# Patient Record
Sex: MALE | Race: WHITE | NOT HISPANIC OR LATINO | Employment: FULL TIME | URBAN - METROPOLITAN AREA
[De-identification: names, ages, dates, MRNs, and addresses within clinical notes are randomized per-mention and may not be internally consistent; named-entity substitution may affect disease eponyms.]

---

## 2018-01-01 ENCOUNTER — APPOINTMENT (OUTPATIENT)
Dept: RADIOLOGY | Facility: MEDICAL CENTER | Age: 68
DRG: 436 | End: 2018-01-01
Attending: INTERNAL MEDICINE
Payer: MEDICARE

## 2018-01-01 ENCOUNTER — HOME CARE VISIT (OUTPATIENT)
Dept: HOSPICE | Facility: HOSPICE | Age: 68
End: 2018-01-01
Payer: MEDICARE

## 2018-01-01 ENCOUNTER — APPOINTMENT (OUTPATIENT)
Dept: RADIOLOGY | Facility: MEDICAL CENTER | Age: 68
End: 2018-01-01
Attending: EMERGENCY MEDICINE
Payer: MEDICARE

## 2018-01-01 ENCOUNTER — HOSPICE ADMISSION (OUTPATIENT)
Dept: HOSPICE | Facility: HOSPICE | Age: 68
End: 2018-01-01
Payer: MEDICARE

## 2018-01-01 ENCOUNTER — OFFICE VISIT (OUTPATIENT)
Dept: URGENT CARE | Facility: CLINIC | Age: 68
End: 2018-01-01
Payer: MEDICARE

## 2018-01-01 ENCOUNTER — HOSPITAL ENCOUNTER (OUTPATIENT)
Dept: RADIOLOGY | Facility: MEDICAL CENTER | Age: 68
End: 2018-09-19
Attending: PHYSICIAN ASSISTANT
Payer: MEDICARE

## 2018-01-01 ENCOUNTER — OFFICE VISIT (OUTPATIENT)
Dept: MEDICAL GROUP | Facility: MEDICAL CENTER | Age: 68
End: 2018-01-01
Payer: MEDICARE

## 2018-01-01 ENCOUNTER — HOSPITAL ENCOUNTER (OUTPATIENT)
Facility: MEDICAL CENTER | Age: 68
End: 2018-09-17
Attending: PHYSICIAN ASSISTANT
Payer: MEDICARE

## 2018-01-01 ENCOUNTER — HOSPITAL ENCOUNTER (OUTPATIENT)
Dept: LAB | Facility: MEDICAL CENTER | Age: 68
End: 2018-09-17
Attending: PHYSICIAN ASSISTANT
Payer: MEDICARE

## 2018-01-01 ENCOUNTER — TELEPHONE (OUTPATIENT)
Dept: HEMATOLOGY ONCOLOGY | Facility: MEDICAL CENTER | Age: 68
End: 2018-01-01

## 2018-01-01 ENCOUNTER — APPOINTMENT (OUTPATIENT)
Dept: RADIOLOGY | Facility: IMAGING CENTER | Age: 68
End: 2018-01-01
Attending: NURSE PRACTITIONER
Payer: MEDICARE

## 2018-01-01 ENCOUNTER — APPOINTMENT (OUTPATIENT)
Dept: RADIOLOGY | Facility: MEDICAL CENTER | Age: 68
DRG: 436 | End: 2018-01-01
Attending: EMERGENCY MEDICINE
Payer: MEDICARE

## 2018-01-01 ENCOUNTER — HOSPITAL ENCOUNTER (OUTPATIENT)
Dept: RADIOLOGY | Facility: MEDICAL CENTER | Age: 68
End: 2018-09-18
Attending: PHYSICIAN ASSISTANT
Payer: MEDICARE

## 2018-01-01 ENCOUNTER — HOSPITAL ENCOUNTER (INPATIENT)
Facility: MEDICAL CENTER | Age: 68
LOS: 4 days | DRG: 436 | End: 2018-10-12
Attending: EMERGENCY MEDICINE | Admitting: HOSPITALIST
Payer: MEDICARE

## 2018-01-01 ENCOUNTER — OFFICE VISIT (OUTPATIENT)
Dept: HEMATOLOGY ONCOLOGY | Facility: MEDICAL CENTER | Age: 68
End: 2018-01-01
Payer: MEDICARE

## 2018-01-01 ENCOUNTER — TELEPHONE (OUTPATIENT)
Dept: URGENT CARE | Facility: CLINIC | Age: 68
End: 2018-01-01

## 2018-01-01 ENCOUNTER — HOSPITAL ENCOUNTER (EMERGENCY)
Facility: MEDICAL CENTER | Age: 68
End: 2018-09-25
Attending: EMERGENCY MEDICINE
Payer: MEDICARE

## 2018-01-01 ENCOUNTER — HOSPITAL ENCOUNTER (OUTPATIENT)
Dept: LAB | Facility: MEDICAL CENTER | Age: 68
End: 2018-09-18
Attending: PHYSICIAN ASSISTANT
Payer: MEDICARE

## 2018-01-01 VITALS
WEIGHT: 141.76 LBS | RESPIRATION RATE: 18 BRPM | BODY MASS INDEX: 20.29 KG/M2 | OXYGEN SATURATION: 99 % | HEIGHT: 70 IN | TEMPERATURE: 97.5 F | DIASTOLIC BLOOD PRESSURE: 69 MMHG | HEART RATE: 82 BPM | SYSTOLIC BLOOD PRESSURE: 139 MMHG

## 2018-01-01 VITALS
OXYGEN SATURATION: 98 % | HEIGHT: 70 IN | WEIGHT: 140.2 LBS | BODY MASS INDEX: 20.07 KG/M2 | SYSTOLIC BLOOD PRESSURE: 116 MMHG | RESPIRATION RATE: 16 BRPM | TEMPERATURE: 98 F | HEART RATE: 96 BPM | DIASTOLIC BLOOD PRESSURE: 84 MMHG

## 2018-01-01 VITALS — HEART RATE: 88 BPM | RESPIRATION RATE: 16 BRPM | DIASTOLIC BLOOD PRESSURE: 70 MMHG | SYSTOLIC BLOOD PRESSURE: 120 MMHG

## 2018-01-01 VITALS
BODY MASS INDEX: 20.29 KG/M2 | OXYGEN SATURATION: 94 % | SYSTOLIC BLOOD PRESSURE: 100 MMHG | HEART RATE: 85 BPM | WEIGHT: 141.76 LBS | TEMPERATURE: 98.8 F | RESPIRATION RATE: 16 BRPM | DIASTOLIC BLOOD PRESSURE: 64 MMHG | HEIGHT: 70 IN

## 2018-01-01 VITALS — RESPIRATION RATE: 24 BRPM | SYSTOLIC BLOOD PRESSURE: 102 MMHG | DIASTOLIC BLOOD PRESSURE: 60 MMHG | HEART RATE: 104 BPM

## 2018-01-01 VITALS
BODY MASS INDEX: 20.5 KG/M2 | TEMPERATURE: 98.6 F | RESPIRATION RATE: 16 BRPM | WEIGHT: 143.2 LBS | OXYGEN SATURATION: 96 % | DIASTOLIC BLOOD PRESSURE: 86 MMHG | HEIGHT: 70 IN | HEART RATE: 72 BPM | SYSTOLIC BLOOD PRESSURE: 130 MMHG

## 2018-01-01 VITALS
DIASTOLIC BLOOD PRESSURE: 80 MMHG | RESPIRATION RATE: 16 BRPM | TEMPERATURE: 98.5 F | HEART RATE: 100 BPM | SYSTOLIC BLOOD PRESSURE: 155 MMHG | OXYGEN SATURATION: 95 %

## 2018-01-01 VITALS
SYSTOLIC BLOOD PRESSURE: 116 MMHG | DIASTOLIC BLOOD PRESSURE: 68 MMHG | RESPIRATION RATE: 18 BRPM | OXYGEN SATURATION: 99 % | BODY MASS INDEX: 20.56 KG/M2 | WEIGHT: 143.6 LBS | HEIGHT: 70 IN | HEART RATE: 86 BPM | TEMPERATURE: 98.7 F

## 2018-01-01 VITALS
HEART RATE: 95 BPM | BODY MASS INDEX: 21.08 KG/M2 | RESPIRATION RATE: 17 BRPM | OXYGEN SATURATION: 92 % | TEMPERATURE: 97.7 F | DIASTOLIC BLOOD PRESSURE: 79 MMHG | HEIGHT: 70 IN | SYSTOLIC BLOOD PRESSURE: 165 MMHG | WEIGHT: 147.27 LBS

## 2018-01-01 VITALS — DIASTOLIC BLOOD PRESSURE: 60 MMHG | HEART RATE: 100 BPM | SYSTOLIC BLOOD PRESSURE: 110 MMHG | RESPIRATION RATE: 16 BRPM

## 2018-01-01 VITALS
BODY MASS INDEX: 21.19 KG/M2 | HEART RATE: 88 BPM | HEIGHT: 70 IN | OXYGEN SATURATION: 92 % | SYSTOLIC BLOOD PRESSURE: 120 MMHG | DIASTOLIC BLOOD PRESSURE: 70 MMHG | TEMPERATURE: 98.2 F | RESPIRATION RATE: 20 BRPM | WEIGHT: 148 LBS

## 2018-01-01 VITALS
TEMPERATURE: 98.2 F | WEIGHT: 143 LBS | HEIGHT: 70 IN | SYSTOLIC BLOOD PRESSURE: 120 MMHG | BODY MASS INDEX: 20.47 KG/M2 | DIASTOLIC BLOOD PRESSURE: 70 MMHG | OXYGEN SATURATION: 96 % | RESPIRATION RATE: 16 BRPM | HEART RATE: 87 BPM

## 2018-01-01 VITALS — SYSTOLIC BLOOD PRESSURE: 138 MMHG | DIASTOLIC BLOOD PRESSURE: 70 MMHG | RESPIRATION RATE: 18 BRPM | HEART RATE: 100 BPM

## 2018-01-01 VITALS — RESPIRATION RATE: 16 BRPM | HEART RATE: 100 BPM

## 2018-01-01 VITALS — RESPIRATION RATE: 16 BRPM | HEART RATE: 80 BPM | DIASTOLIC BLOOD PRESSURE: 60 MMHG | SYSTOLIC BLOOD PRESSURE: 120 MMHG

## 2018-01-01 DIAGNOSIS — K86.89 PANCREATIC MASS: ICD-10-CM

## 2018-01-01 DIAGNOSIS — J22 LRTI (LOWER RESPIRATORY TRACT INFECTION): ICD-10-CM

## 2018-01-01 DIAGNOSIS — R35.0 URINARY FREQUENCY: ICD-10-CM

## 2018-01-01 DIAGNOSIS — R91.8 LUNG FIELD ABNORMAL FINDING ON EXAMINATION: ICD-10-CM

## 2018-01-01 DIAGNOSIS — R63.4 WEIGHT LOSS: ICD-10-CM

## 2018-01-01 DIAGNOSIS — R10.9 ABDOMINAL PAIN, UNSPECIFIED ABDOMINAL LOCATION: ICD-10-CM

## 2018-01-01 DIAGNOSIS — K59.00 CONSTIPATION, UNSPECIFIED CONSTIPATION TYPE: ICD-10-CM

## 2018-01-01 DIAGNOSIS — R05.3 PERSISTENT COUGH: ICD-10-CM

## 2018-01-01 DIAGNOSIS — R10.31 RIGHT LOWER QUADRANT ABDOMINAL PAIN: ICD-10-CM

## 2018-01-01 DIAGNOSIS — J11.1 INFLUENZA-LIKE ILLNESS: ICD-10-CM

## 2018-01-01 DIAGNOSIS — R05.9 COUGH: ICD-10-CM

## 2018-01-01 DIAGNOSIS — Z00.00 HEALTHCARE MAINTENANCE: ICD-10-CM

## 2018-01-01 DIAGNOSIS — E87.1 HYPONATREMIA: ICD-10-CM

## 2018-01-01 DIAGNOSIS — R10.9 ACUTE ABDOMINAL PAIN: ICD-10-CM

## 2018-01-01 DIAGNOSIS — R52 INTRACTABLE PAIN: ICD-10-CM

## 2018-01-01 DIAGNOSIS — J98.8 WHEEZING-ASSOCIATED RESPIRATORY INFECTION (WARI): ICD-10-CM

## 2018-01-01 DIAGNOSIS — R10.13 EPIGASTRIC PAIN: ICD-10-CM

## 2018-01-01 DIAGNOSIS — R10.84 GENERALIZED ABDOMINAL PAIN: ICD-10-CM

## 2018-01-01 LAB
ALBUMIN SERPL BCP-MCNC: 2.9 G/DL (ref 3.2–4.9)
ALBUMIN SERPL BCP-MCNC: 3.1 G/DL (ref 3.2–4.9)
ALBUMIN SERPL BCP-MCNC: 3.3 G/DL (ref 3.2–4.9)
ALBUMIN SERPL BCP-MCNC: 3.9 G/DL (ref 3.2–4.9)
ALBUMIN SERPL BCP-MCNC: 4 G/DL (ref 3.2–4.9)
ALBUMIN SERPL BCP-MCNC: 4.3 G/DL (ref 3.2–4.9)
ALBUMIN/GLOB SERPL: 1.1 G/DL
ALBUMIN/GLOB SERPL: 1.2 G/DL
ALBUMIN/GLOB SERPL: 1.3 G/DL
ALBUMIN/GLOB SERPL: 1.4 G/DL
ALBUMIN/GLOB SERPL: 1.6 G/DL
ALBUMIN/GLOB SERPL: 1.7 G/DL
ALP SERPL-CCNC: 40 U/L (ref 30–99)
ALP SERPL-CCNC: 40 U/L (ref 30–99)
ALP SERPL-CCNC: 43 U/L (ref 30–99)
ALP SERPL-CCNC: 45 U/L (ref 30–99)
ALP SERPL-CCNC: 45 U/L (ref 30–99)
ALP SERPL-CCNC: 52 U/L (ref 30–99)
ALT SERPL-CCNC: 10 U/L (ref 2–50)
ALT SERPL-CCNC: 5 U/L (ref 2–50)
ALT SERPL-CCNC: 7 U/L (ref 2–50)
ALT SERPL-CCNC: 7 U/L (ref 2–50)
ALT SERPL-CCNC: 8 U/L (ref 2–50)
ALT SERPL-CCNC: 9 U/L (ref 2–50)
ANION GAP SERPL CALC-SCNC: 10 MMOL/L (ref 0–11.9)
ANION GAP SERPL CALC-SCNC: 11 MMOL/L (ref 0–11.9)
ANION GAP SERPL CALC-SCNC: 14 MMOL/L (ref 0–11.9)
ANION GAP SERPL CALC-SCNC: 17 MMOL/L (ref 0–11.9)
ANION GAP SERPL CALC-SCNC: 8 MMOL/L (ref 0–11.9)
ANION GAP SERPL CALC-SCNC: 9 MMOL/L (ref 0–11.9)
APPEARANCE UR: CLEAR
APPEARANCE UR: CLEAR
AST SERPL-CCNC: 11 U/L (ref 12–45)
AST SERPL-CCNC: 13 U/L (ref 12–45)
AST SERPL-CCNC: 14 U/L (ref 12–45)
AST SERPL-CCNC: 17 U/L (ref 12–45)
AST SERPL-CCNC: 17 U/L (ref 12–45)
AST SERPL-CCNC: 22 U/L (ref 12–45)
BACTERIA UR CULT: NORMAL
BASOPHILS # BLD AUTO: 0.1 % (ref 0–1.8)
BASOPHILS # BLD AUTO: 0.3 % (ref 0–1.8)
BASOPHILS # BLD AUTO: 0.5 % (ref 0–1.8)
BASOPHILS # BLD AUTO: 0.5 % (ref 0–1.8)
BASOPHILS # BLD: 0.02 K/UL (ref 0–0.12)
BASOPHILS # BLD: 0.04 K/UL (ref 0–0.12)
BASOPHILS # BLD: 0.04 K/UL (ref 0–0.12)
BASOPHILS # BLD: 0.05 K/UL (ref 0–0.12)
BASOPHILS # BLD: 0.05 K/UL (ref 0–0.12)
BASOPHILS # BLD: 0.06 K/UL (ref 0–0.12)
BASOPHILS # BLD: 0.06 K/UL (ref 0–0.12)
BILIRUB SERPL-MCNC: 0.5 MG/DL (ref 0.1–1.5)
BILIRUB SERPL-MCNC: 0.7 MG/DL (ref 0.1–1.5)
BILIRUB SERPL-MCNC: 1.1 MG/DL (ref 0.1–1.5)
BILIRUB SERPL-MCNC: 1.1 MG/DL (ref 0.1–1.5)
BILIRUB SERPL-MCNC: 1.4 MG/DL (ref 0.1–1.5)
BILIRUB SERPL-MCNC: 1.7 MG/DL (ref 0.1–1.5)
BILIRUB UR STRIP-MCNC: NORMAL MG/DL
BUN SERPL-MCNC: 10 MG/DL (ref 8–22)
BUN SERPL-MCNC: 11 MG/DL (ref 8–22)
BUN SERPL-MCNC: 12 MG/DL (ref 8–22)
BUN SERPL-MCNC: 7 MG/DL (ref 8–22)
BUN SERPL-MCNC: 7 MG/DL (ref 8–22)
BUN SERPL-MCNC: 9 MG/DL (ref 8–22)
CALCIUM SERPL-MCNC: 8.4 MG/DL (ref 8.4–10.2)
CALCIUM SERPL-MCNC: 8.8 MG/DL (ref 8.4–10.2)
CALCIUM SERPL-MCNC: 8.8 MG/DL (ref 8.4–10.2)
CALCIUM SERPL-MCNC: 9.1 MG/DL (ref 8.4–10.2)
CALCIUM SERPL-MCNC: 9.4 MG/DL (ref 8.5–10.5)
CALCIUM SERPL-MCNC: 9.7 MG/DL (ref 8.4–10.2)
CANCER AG19-9 SERPL-ACNC: 51.9 U/ML (ref 0–35)
CEA SERPL-MCNC: 1.1 NG/ML (ref 0–3)
CHLORIDE SERPL-SCNC: 102 MMOL/L (ref 96–112)
CHLORIDE SERPL-SCNC: 89 MMOL/L (ref 96–112)
CHLORIDE SERPL-SCNC: 93 MMOL/L (ref 96–112)
CHLORIDE SERPL-SCNC: 96 MMOL/L (ref 96–112)
CHLORIDE SERPL-SCNC: 96 MMOL/L (ref 96–112)
CHLORIDE SERPL-SCNC: 97 MMOL/L (ref 96–112)
CO2 SERPL-SCNC: 19 MMOL/L (ref 20–33)
CO2 SERPL-SCNC: 22 MMOL/L (ref 20–33)
CO2 SERPL-SCNC: 22 MMOL/L (ref 20–33)
CO2 SERPL-SCNC: 23 MMOL/L (ref 20–33)
CO2 SERPL-SCNC: 26 MMOL/L (ref 20–33)
CO2 SERPL-SCNC: 28 MMOL/L (ref 20–33)
COLOR UR AUTO: YELLOW
COLOR UR: YELLOW
CREAT SERPL-MCNC: 0.85 MG/DL (ref 0.5–1.4)
CREAT SERPL-MCNC: 0.92 MG/DL (ref 0.5–1.4)
CREAT SERPL-MCNC: 0.95 MG/DL (ref 0.5–1.4)
CREAT SERPL-MCNC: 0.96 MG/DL (ref 0.5–1.4)
CREAT SERPL-MCNC: 0.99 MG/DL (ref 0.5–1.4)
CREAT SERPL-MCNC: 1.02 MG/DL (ref 0.5–1.4)
EOSINOPHIL # BLD AUTO: 0 K/UL (ref 0–0.51)
EOSINOPHIL # BLD AUTO: 0.04 K/UL (ref 0–0.51)
EOSINOPHIL # BLD AUTO: 0.07 K/UL (ref 0–0.51)
EOSINOPHIL # BLD AUTO: 0.09 K/UL (ref 0–0.51)
EOSINOPHIL # BLD AUTO: 0.12 K/UL (ref 0–0.51)
EOSINOPHIL # BLD AUTO: 0.44 K/UL (ref 0–0.51)
EOSINOPHIL # BLD AUTO: 0.46 K/UL (ref 0–0.51)
EOSINOPHIL NFR BLD: 0 % (ref 0–6.9)
EOSINOPHIL NFR BLD: 0.2 % (ref 0–6.9)
EOSINOPHIL NFR BLD: 0.4 % (ref 0–6.9)
EOSINOPHIL NFR BLD: 0.6 % (ref 0–6.9)
EOSINOPHIL NFR BLD: 0.8 % (ref 0–6.9)
EOSINOPHIL NFR BLD: 3.4 % (ref 0–6.9)
EOSINOPHIL NFR BLD: 4.2 % (ref 0–6.9)
ERYTHROCYTE [DISTWIDTH] IN BLOOD BY AUTOMATED COUNT: 40.1 FL (ref 35.9–50)
ERYTHROCYTE [DISTWIDTH] IN BLOOD BY AUTOMATED COUNT: 43.2 FL (ref 35.9–50)
ERYTHROCYTE [DISTWIDTH] IN BLOOD BY AUTOMATED COUNT: 43.6 FL (ref 35.9–50)
ERYTHROCYTE [DISTWIDTH] IN BLOOD BY AUTOMATED COUNT: 44 FL (ref 35.9–50)
ERYTHROCYTE [DISTWIDTH] IN BLOOD BY AUTOMATED COUNT: 44.2 FL (ref 35.9–50)
ERYTHROCYTE [DISTWIDTH] IN BLOOD BY AUTOMATED COUNT: 44.6 FL (ref 35.9–50)
ERYTHROCYTE [DISTWIDTH] IN BLOOD BY AUTOMATED COUNT: 45.5 FL (ref 35.9–50)
GLOBULIN SER CALC-MCNC: 1.8 G/DL (ref 1.9–3.5)
GLOBULIN SER CALC-MCNC: 2.6 G/DL (ref 1.9–3.5)
GLOBULIN SER CALC-MCNC: 2.7 G/DL (ref 1.9–3.5)
GLOBULIN SER CALC-MCNC: 2.8 G/DL (ref 1.9–3.5)
GLOBULIN SER CALC-MCNC: 2.9 G/DL (ref 1.9–3.5)
GLOBULIN SER CALC-MCNC: 3.2 G/DL (ref 1.9–3.5)
GLUCOSE SERPL-MCNC: 105 MG/DL (ref 65–99)
GLUCOSE SERPL-MCNC: 111 MG/DL (ref 65–99)
GLUCOSE SERPL-MCNC: 111 MG/DL (ref 65–99)
GLUCOSE SERPL-MCNC: 113 MG/DL (ref 65–99)
GLUCOSE SERPL-MCNC: 114 MG/DL (ref 65–99)
GLUCOSE SERPL-MCNC: 122 MG/DL (ref 65–99)
GLUCOSE UR STRIP.AUTO-MCNC: NEGATIVE MG/DL
GLUCOSE UR STRIP.AUTO-MCNC: NORMAL MG/DL
HCT VFR BLD AUTO: 38.8 % (ref 42–52)
HCT VFR BLD AUTO: 41.2 % (ref 42–52)
HCT VFR BLD AUTO: 41.4 % (ref 42–52)
HCT VFR BLD AUTO: 42.2 % (ref 42–52)
HCT VFR BLD AUTO: 42.6 % (ref 42–52)
HCT VFR BLD AUTO: 43.2 % (ref 42–52)
HCT VFR BLD AUTO: 45.9 % (ref 42–52)
HGB BLD-MCNC: 13.1 G/DL (ref 14–18)
HGB BLD-MCNC: 14 G/DL (ref 14–18)
HGB BLD-MCNC: 14 G/DL (ref 14–18)
HGB BLD-MCNC: 14.5 G/DL (ref 14–18)
HGB BLD-MCNC: 14.7 G/DL (ref 14–18)
HGB BLD-MCNC: 15.4 G/DL (ref 14–18)
HGB BLD-MCNC: 15.7 G/DL (ref 14–18)
IMM GRANULOCYTES # BLD AUTO: 0.03 K/UL (ref 0–0.11)
IMM GRANULOCYTES # BLD AUTO: 0.06 K/UL (ref 0–0.11)
IMM GRANULOCYTES # BLD AUTO: 0.06 K/UL (ref 0–0.11)
IMM GRANULOCYTES # BLD AUTO: 0.08 K/UL (ref 0–0.11)
IMM GRANULOCYTES # BLD AUTO: 0.08 K/UL (ref 0–0.11)
IMM GRANULOCYTES # BLD AUTO: 0.09 K/UL (ref 0–0.11)
IMM GRANULOCYTES # BLD AUTO: 0.09 K/UL (ref 0–0.11)
IMM GRANULOCYTES NFR BLD AUTO: 0.3 % (ref 0–0.9)
IMM GRANULOCYTES NFR BLD AUTO: 0.4 % (ref 0–0.9)
IMM GRANULOCYTES NFR BLD AUTO: 0.5 % (ref 0–0.9)
IMM GRANULOCYTES NFR BLD AUTO: 0.6 % (ref 0–0.9)
INR PPP: 1.21 (ref 0.87–1.13)
KETONES UR STRIP.AUTO-MCNC: 15 MG/DL
KETONES UR STRIP.AUTO-MCNC: NEGATIVE MG/DL
LACTATE BLD-SCNC: 2.4 MMOL/L (ref 0.5–2)
LEUKOCYTE ESTERASE UR QL STRIP.AUTO: NEGATIVE
LEUKOCYTE ESTERASE UR QL STRIP.AUTO: NORMAL
LIPASE SERPL-CCNC: 18 U/L (ref 11–82)
LIPASE SERPL-CCNC: 19 U/L (ref 7–58)
LIPASE SERPL-CCNC: 21 U/L (ref 7–58)
LYMPHOCYTES # BLD AUTO: 0.95 K/UL (ref 1–4.8)
LYMPHOCYTES # BLD AUTO: 1.08 K/UL (ref 1–4.8)
LYMPHOCYTES # BLD AUTO: 1.08 K/UL (ref 1–4.8)
LYMPHOCYTES # BLD AUTO: 1.1 K/UL (ref 1–4.8)
LYMPHOCYTES # BLD AUTO: 1.59 K/UL (ref 1–4.8)
LYMPHOCYTES # BLD AUTO: 1.68 K/UL (ref 1–4.8)
LYMPHOCYTES # BLD AUTO: 1.72 K/UL (ref 1–4.8)
LYMPHOCYTES NFR BLD: 12.9 % (ref 22–41)
LYMPHOCYTES NFR BLD: 15.7 % (ref 22–41)
LYMPHOCYTES NFR BLD: 5.4 % (ref 22–41)
LYMPHOCYTES NFR BLD: 6.4 % (ref 22–41)
LYMPHOCYTES NFR BLD: 6.8 % (ref 22–41)
LYMPHOCYTES NFR BLD: 7.6 % (ref 22–41)
LYMPHOCYTES NFR BLD: 8.3 % (ref 22–41)
MAGNESIUM SERPL-MCNC: 1.9 MG/DL (ref 1.5–2.5)
MAGNESIUM SERPL-MCNC: 2 MG/DL (ref 1.5–2.5)
MAGNESIUM SERPL-MCNC: 2.1 MG/DL (ref 1.5–2.5)
MCH RBC QN AUTO: 31.5 PG (ref 27–33)
MCH RBC QN AUTO: 31.7 PG (ref 27–33)
MCH RBC QN AUTO: 31.8 PG (ref 27–33)
MCH RBC QN AUTO: 31.8 PG (ref 27–33)
MCH RBC QN AUTO: 32 PG (ref 27–33)
MCH RBC QN AUTO: 32 PG (ref 27–33)
MCH RBC QN AUTO: 32.6 PG (ref 27–33)
MCHC RBC AUTO-ENTMCNC: 33.8 G/DL (ref 33.7–35.3)
MCHC RBC AUTO-ENTMCNC: 33.8 G/DL (ref 33.7–35.3)
MCHC RBC AUTO-ENTMCNC: 34 G/DL (ref 33.7–35.3)
MCHC RBC AUTO-ENTMCNC: 34 G/DL (ref 33.7–35.3)
MCHC RBC AUTO-ENTMCNC: 34.2 G/DL (ref 33.7–35.3)
MCHC RBC AUTO-ENTMCNC: 34.4 G/DL (ref 33.7–35.3)
MCHC RBC AUTO-ENTMCNC: 36.2 G/DL (ref 33.7–35.3)
MCV RBC AUTO: 88 FL (ref 81.4–97.8)
MCV RBC AUTO: 91.5 FL (ref 81.4–97.8)
MCV RBC AUTO: 93.1 FL (ref 81.4–97.8)
MCV RBC AUTO: 94.1 FL (ref 81.4–97.8)
MCV RBC AUTO: 94.2 FL (ref 81.4–97.8)
MCV RBC AUTO: 94.5 FL (ref 81.4–97.8)
MCV RBC AUTO: 95.4 FL (ref 81.4–97.8)
MONOCYTES # BLD AUTO: 1.27 K/UL (ref 0–0.85)
MONOCYTES # BLD AUTO: 1.27 K/UL (ref 0–0.85)
MONOCYTES # BLD AUTO: 1.45 K/UL (ref 0–0.85)
MONOCYTES # BLD AUTO: 1.51 K/UL (ref 0–0.85)
MONOCYTES # BLD AUTO: 1.55 K/UL (ref 0–0.85)
MONOCYTES # BLD AUTO: 2.05 K/UL (ref 0–0.85)
MONOCYTES # BLD AUTO: 2.13 K/UL (ref 0–0.85)
MONOCYTES NFR BLD AUTO: 10.4 % (ref 0–13.4)
MONOCYTES NFR BLD AUTO: 11.1 % (ref 0–13.4)
MONOCYTES NFR BLD AUTO: 11.2 % (ref 0–13.4)
MONOCYTES NFR BLD AUTO: 11.6 % (ref 0–13.4)
MONOCYTES NFR BLD AUTO: 12.8 % (ref 0–13.4)
MONOCYTES NFR BLD AUTO: 7.2 % (ref 0–13.4)
MONOCYTES NFR BLD AUTO: 9.2 % (ref 0–13.4)
NEUTROPHILS # BLD AUTO: 11.7 K/UL (ref 1.82–7.42)
NEUTROPHILS # BLD AUTO: 12.66 K/UL (ref 1.82–7.42)
NEUTROPHILS # BLD AUTO: 13.93 K/UL (ref 1.82–7.42)
NEUTROPHILS # BLD AUTO: 15.19 K/UL (ref 1.82–7.42)
NEUTROPHILS # BLD AUTO: 15.26 K/UL (ref 1.82–7.42)
NEUTROPHILS # BLD AUTO: 7.44 K/UL (ref 1.82–7.42)
NEUTROPHILS # BLD AUTO: 9.34 K/UL (ref 1.82–7.42)
NEUTROPHILS NFR BLD: 67.7 % (ref 44–72)
NEUTROPHILS NFR BLD: 71.6 % (ref 44–72)
NEUTROPHILS NFR BLD: 79.1 % (ref 44–72)
NEUTROPHILS NFR BLD: 79.5 % (ref 44–72)
NEUTROPHILS NFR BLD: 80.3 % (ref 44–72)
NEUTROPHILS NFR BLD: 83.2 % (ref 44–72)
NEUTROPHILS NFR BLD: 86.8 % (ref 44–72)
NITRITE UR QL STRIP.AUTO: NEGATIVE
NITRITE UR QL STRIP.AUTO: NORMAL
NRBC # BLD AUTO: 0 K/UL
NRBC BLD-RTO: 0 /100 WBC
PATHOLOGY CONSULT NOTE: NORMAL
PH UR STRIP.AUTO: 6 [PH]
PH UR STRIP.AUTO: 6.5 [PH] (ref 5–8)
PLATELET # BLD AUTO: 185 K/UL (ref 164–446)
PLATELET # BLD AUTO: 260 K/UL (ref 164–446)
PLATELET # BLD AUTO: 267 K/UL (ref 164–446)
PLATELET # BLD AUTO: 308 K/UL (ref 164–446)
PLATELET # BLD AUTO: 328 K/UL (ref 164–446)
PLATELET # BLD AUTO: 426 K/UL (ref 164–446)
PLATELET # BLD AUTO: 477 K/UL (ref 164–446)
PMV BLD AUTO: 10.8 FL (ref 9–12.9)
PMV BLD AUTO: 8.5 FL (ref 9–12.9)
PMV BLD AUTO: 8.6 FL (ref 9–12.9)
PMV BLD AUTO: 8.7 FL (ref 9–12.9)
PMV BLD AUTO: 8.8 FL (ref 9–12.9)
PMV BLD AUTO: 9.2 FL (ref 9–12.9)
PMV BLD AUTO: 9.5 FL (ref 9–12.9)
POTASSIUM SERPL-SCNC: 3.5 MMOL/L (ref 3.6–5.5)
POTASSIUM SERPL-SCNC: 3.7 MMOL/L (ref 3.6–5.5)
POTASSIUM SERPL-SCNC: 3.9 MMOL/L (ref 3.6–5.5)
POTASSIUM SERPL-SCNC: 4.1 MMOL/L (ref 3.6–5.5)
POTASSIUM SERPL-SCNC: 4.8 MMOL/L (ref 3.6–5.5)
POTASSIUM SERPL-SCNC: 4.8 MMOL/L (ref 3.6–5.5)
PROT SERPL-MCNC: 4.7 G/DL (ref 6–8.2)
PROT SERPL-MCNC: 6 G/DL (ref 6–8.2)
PROT SERPL-MCNC: 6 G/DL (ref 6–8.2)
PROT SERPL-MCNC: 6.7 G/DL (ref 6–8.2)
PROT SERPL-MCNC: 6.9 G/DL (ref 6–8.2)
PROT SERPL-MCNC: 7.2 G/DL (ref 6–8.2)
PROT UR QL STRIP: NEGATIVE MG/DL
PROT UR QL STRIP: NORMAL MG/DL
PROTHROMBIN TIME: 15.2 SEC (ref 12–14.6)
RBC # BLD AUTO: 4.12 M/UL (ref 4.7–6.1)
RBC # BLD AUTO: 4.38 M/UL (ref 4.7–6.1)
RBC # BLD AUTO: 4.38 M/UL (ref 4.7–6.1)
RBC # BLD AUTO: 4.61 M/UL (ref 4.7–6.1)
RBC # BLD AUTO: 4.64 M/UL (ref 4.7–6.1)
RBC # BLD AUTO: 4.81 M/UL (ref 4.7–6.1)
RBC # BLD AUTO: 4.84 M/UL (ref 4.7–6.1)
RBC UR QL AUTO: NEGATIVE
RBC UR QL AUTO: NORMAL
SIGNIFICANT IND 70042: NORMAL
SITE SITE: NORMAL
SODIUM SERPL-SCNC: 128 MMOL/L (ref 135–145)
SODIUM SERPL-SCNC: 129 MMOL/L (ref 135–145)
SODIUM SERPL-SCNC: 130 MMOL/L (ref 135–145)
SODIUM SERPL-SCNC: 130 MMOL/L (ref 135–145)
SODIUM SERPL-SCNC: 132 MMOL/L (ref 135–145)
SODIUM SERPL-SCNC: 133 MMOL/L (ref 135–145)
SOURCE SOURCE: NORMAL
SP GR UR STRIP.AUTO: 1.01
SP GR UR STRIP.AUTO: 1.01
UROBILINOGEN UR STRIP-MCNC: NORMAL MG/DL
WBC # BLD AUTO: 11 K/UL (ref 4.8–10.8)
WBC # BLD AUTO: 13 K/UL (ref 4.8–10.8)
WBC # BLD AUTO: 14.6 K/UL (ref 4.8–10.8)
WBC # BLD AUTO: 16 K/UL (ref 4.8–10.8)
WBC # BLD AUTO: 16.8 K/UL (ref 4.8–10.8)
WBC # BLD AUTO: 17.6 K/UL (ref 4.8–10.8)
WBC # BLD AUTO: 19.1 K/UL (ref 4.8–10.8)

## 2018-01-01 PROCEDURE — S9126 HOSPICE CARE, IN THE HOME, P: HCPCS

## 2018-01-01 PROCEDURE — 99214 OFFICE O/P EST MOD 30 MIN: CPT | Performed by: PHYSICIAN ASSISTANT

## 2018-01-01 PROCEDURE — 6650990 HC HOSPICE AND HOME CARE RX REV CODE 0250

## 2018-01-01 PROCEDURE — 99223 1ST HOSP IP/OBS HIGH 75: CPT | Performed by: INTERNAL MEDICINE

## 2018-01-01 PROCEDURE — 160035 HCHG PACU - 1ST 60 MINS PHASE I: Performed by: INTERNAL MEDICINE

## 2018-01-01 PROCEDURE — G0299 HHS/HOSPICE OF RN EA 15 MIN: HCPCS

## 2018-01-01 PROCEDURE — A9270 NON-COVERED ITEM OR SERVICE: HCPCS | Performed by: INTERNAL MEDICINE

## 2018-01-01 PROCEDURE — 99205 OFFICE O/P NEW HI 60 MIN: CPT | Performed by: NURSE PRACTITIONER

## 2018-01-01 PROCEDURE — 770001 HCHG ROOM/CARE - MED/SURG/GYN PRIV*

## 2018-01-01 PROCEDURE — 99214 OFFICE O/P EST MOD 30 MIN: CPT | Performed by: EMERGENCY MEDICINE

## 2018-01-01 PROCEDURE — 74160 CT ABDOMEN W/CONTRAST: CPT

## 2018-01-01 PROCEDURE — A9270 NON-COVERED ITEM OR SERVICE: HCPCS | Performed by: HOSPITALIST

## 2018-01-01 PROCEDURE — 71046 X-RAY EXAM CHEST 2 VIEWS: CPT | Mod: TC,FY | Performed by: NURSE PRACTITIONER

## 2018-01-01 PROCEDURE — 85025 COMPLETE CBC W/AUTO DIFF WBC: CPT

## 2018-01-01 PROCEDURE — 700111 HCHG RX REV CODE 636 W/ 250 OVERRIDE (IP): Performed by: EMERGENCY MEDICINE

## 2018-01-01 PROCEDURE — 99219 PR INITIAL OBSERVATION CARE,LEVL II: CPT | Performed by: HOSPITALIST

## 2018-01-01 PROCEDURE — 700102 HCHG RX REV CODE 250 W/ 637 OVERRIDE(OP): Performed by: INTERNAL MEDICINE

## 2018-01-01 PROCEDURE — G0378 HOSPITAL OBSERVATION PER HR: HCPCS

## 2018-01-01 PROCEDURE — 96376 TX/PRO/DX INJ SAME DRUG ADON: CPT

## 2018-01-01 PROCEDURE — 99285 EMERGENCY DEPT VISIT HI MDM: CPT

## 2018-01-01 PROCEDURE — 36415 COLL VENOUS BLD VENIPUNCTURE: CPT

## 2018-01-01 PROCEDURE — 700111 HCHG RX REV CODE 636 W/ 250 OVERRIDE (IP): Performed by: HOSPITALIST

## 2018-01-01 PROCEDURE — 74018 RADEX ABDOMEN 1 VIEW: CPT

## 2018-01-01 PROCEDURE — 80053 COMPREHEN METABOLIC PANEL: CPT

## 2018-01-01 PROCEDURE — 99232 SBSQ HOSP IP/OBS MODERATE 35: CPT | Performed by: INTERNAL MEDICINE

## 2018-01-01 PROCEDURE — 700105 HCHG RX REV CODE 258: Performed by: HOSPITALIST

## 2018-01-01 PROCEDURE — A9270 NON-COVERED ITEM OR SERVICE: HCPCS

## 2018-01-01 PROCEDURE — 96375 TX/PRO/DX INJ NEW DRUG ADDON: CPT

## 2018-01-01 PROCEDURE — 700111 HCHG RX REV CODE 636 W/ 250 OVERRIDE (IP): Performed by: INTERNAL MEDICINE

## 2018-01-01 PROCEDURE — 700102 HCHG RX REV CODE 250 W/ 637 OVERRIDE(OP): Performed by: HOSPITALIST

## 2018-01-01 PROCEDURE — 160048 HCHG OR STATISTICAL LEVEL 1-5: Performed by: INTERNAL MEDICINE

## 2018-01-01 PROCEDURE — 0F9G3ZX DRAINAGE OF PANCREAS, PERCUTANEOUS APPROACH, DIAGNOSTIC: ICD-10-PCS | Performed by: INTERNAL MEDICINE

## 2018-01-01 PROCEDURE — 99203 OFFICE O/P NEW LOW 30 MIN: CPT | Performed by: FAMILY MEDICINE

## 2018-01-01 PROCEDURE — 700111 HCHG RX REV CODE 636 W/ 250 OVERRIDE (IP)

## 2018-01-01 PROCEDURE — 700102 HCHG RX REV CODE 250 W/ 637 OVERRIDE(OP)

## 2018-01-01 PROCEDURE — 700117 HCHG RX CONTRAST REV CODE 255: Performed by: PHYSICIAN ASSISTANT

## 2018-01-01 PROCEDURE — 83735 ASSAY OF MAGNESIUM: CPT

## 2018-01-01 PROCEDURE — 88172 CYTP DX EVAL FNA 1ST EA SITE: CPT

## 2018-01-01 PROCEDURE — 85610 PROTHROMBIN TIME: CPT

## 2018-01-01 PROCEDURE — 76705 ECHO EXAM OF ABDOMEN: CPT

## 2018-01-01 PROCEDURE — 74176 CT ABD & PELVIS W/O CONTRAST: CPT

## 2018-01-01 PROCEDURE — 700101 HCHG RX REV CODE 250

## 2018-01-01 PROCEDURE — 99497 ADVNCD CARE PLAN 30 MIN: CPT | Performed by: HOSPITALIST

## 2018-01-01 PROCEDURE — 88305 TISSUE EXAM BY PATHOLOGIST: CPT | Mod: 59

## 2018-01-01 PROCEDURE — 94760 N-INVAS EAR/PLS OXIMETRY 1: CPT | Mod: 59 | Performed by: NURSE PRACTITIONER

## 2018-01-01 PROCEDURE — 700117 HCHG RX CONTRAST REV CODE 255: Performed by: EMERGENCY MEDICINE

## 2018-01-01 PROCEDURE — 700117 HCHG RX CONTRAST REV CODE 255: Performed by: INTERNAL MEDICINE

## 2018-01-01 PROCEDURE — 83690 ASSAY OF LIPASE: CPT

## 2018-01-01 PROCEDURE — 87086 URINE CULTURE/COLONY COUNT: CPT

## 2018-01-01 PROCEDURE — 700105 HCHG RX REV CODE 258: Performed by: EMERGENCY MEDICINE

## 2018-01-01 PROCEDURE — 99231 SBSQ HOSP IP/OBS SF/LOW 25: CPT | Performed by: INTERNAL MEDICINE

## 2018-01-01 PROCEDURE — 81002 URINALYSIS NONAUTO W/O SCOPE: CPT

## 2018-01-01 PROCEDURE — 99497 ADVNCD CARE PLAN 30 MIN: CPT | Mod: 25 | Performed by: INTERNAL MEDICINE

## 2018-01-01 PROCEDURE — 160203 HCHG ENDO MINUTES - 1ST 30 MINS LEVEL 4: Performed by: INTERNAL MEDICINE

## 2018-01-01 PROCEDURE — 160208 HCHG ENDO MINUTES - EA ADDL 1 MIN LEVEL 4: Performed by: INTERNAL MEDICINE

## 2018-01-01 PROCEDURE — 99219 PR INITIAL OBSERVATION CARE,LEVL II: CPT | Mod: 25 | Performed by: HOSPITALIST

## 2018-01-01 PROCEDURE — 160009 HCHG ANES TIME/MIN: Performed by: INTERNAL MEDICINE

## 2018-01-01 PROCEDURE — 99239 HOSP IP/OBS DSCHRG MGMT >30: CPT | Performed by: INTERNAL MEDICINE

## 2018-01-01 PROCEDURE — 74177 CT ABD & PELVIS W/CONTRAST: CPT

## 2018-01-01 PROCEDURE — 0DB98ZX EXCISION OF DUODENUM, VIA NATURAL OR ARTIFICIAL OPENING ENDOSCOPIC, DIAGNOSTIC: ICD-10-PCS | Performed by: INTERNAL MEDICINE

## 2018-01-01 PROCEDURE — 99214 OFFICE O/P EST MOD 30 MIN: CPT | Mod: 25 | Performed by: NURSE PRACTITIONER

## 2018-01-01 PROCEDURE — 88307 TISSUE EXAM BY PATHOLOGIST: CPT

## 2018-01-01 PROCEDURE — 88173 CYTOPATH EVAL FNA REPORT: CPT

## 2018-01-01 PROCEDURE — 81002 URINALYSIS NONAUTO W/O SCOPE: CPT | Performed by: PHYSICIAN ASSISTANT

## 2018-01-01 PROCEDURE — 665036 HSPC NOTICE OF ELECTION NOE

## 2018-01-01 PROCEDURE — 96374 THER/PROPH/DIAG INJ IV PUSH: CPT

## 2018-01-01 PROCEDURE — 83605 ASSAY OF LACTIC ACID: CPT

## 2018-01-01 PROCEDURE — 86301 IMMUNOASSAY TUMOR CA 19-9: CPT

## 2018-01-01 PROCEDURE — 500066 HCHG BITE BLOCK, ECT: Performed by: INTERNAL MEDICINE

## 2018-01-01 PROCEDURE — 94664 DEMO&/EVAL PT USE INHALER: CPT | Performed by: NURSE PRACTITIONER

## 2018-01-01 PROCEDURE — 160002 HCHG RECOVERY MINUTES (STAT): Performed by: INTERNAL MEDICINE

## 2018-01-01 PROCEDURE — 99232 SBSQ HOSP IP/OBS MODERATE 35: CPT | Performed by: HOSPITALIST

## 2018-01-01 PROCEDURE — 82378 CARCINOEMBRYONIC ANTIGEN: CPT

## 2018-01-01 RX ORDER — HYDROMORPHONE HYDROCHLORIDE 2 MG/ML
1 INJECTION, SOLUTION INTRAMUSCULAR; INTRAVENOUS; SUBCUTANEOUS
Status: DISCONTINUED | OUTPATIENT
Start: 2018-01-01 | End: 2018-01-01

## 2018-01-01 RX ORDER — SIMETHICONE 80 MG
80 TABLET,CHEWABLE ORAL
Status: DISCONTINUED | OUTPATIENT
Start: 2018-01-01 | End: 2018-01-01 | Stop reason: HOSPADM

## 2018-01-01 RX ORDER — METOCLOPRAMIDE HYDROCHLORIDE 5 MG/ML
10 INJECTION INTRAMUSCULAR; INTRAVENOUS ONCE
Status: CANCELLED | OUTPATIENT
Start: 2018-01-01 | End: 2018-01-01

## 2018-01-01 RX ORDER — OXYCODONE HYDROCHLORIDE 5 MG/1
2.5 TABLET ORAL
Status: DISCONTINUED | OUTPATIENT
Start: 2018-01-01 | End: 2018-01-01

## 2018-01-01 RX ORDER — HYDROMORPHONE HYDROCHLORIDE 2 MG/ML
0.5 INJECTION, SOLUTION INTRAMUSCULAR; INTRAVENOUS; SUBCUTANEOUS
Status: DISCONTINUED | OUTPATIENT
Start: 2018-01-01 | End: 2018-01-01

## 2018-01-01 RX ORDER — ONDANSETRON 4 MG/1
4 TABLET, ORALLY DISINTEGRATING ORAL EVERY 4 HOURS PRN
Status: DISCONTINUED | OUTPATIENT
Start: 2018-01-01 | End: 2018-01-01 | Stop reason: HOSPADM

## 2018-01-01 RX ORDER — ONDANSETRON 2 MG/ML
4 INJECTION INTRAMUSCULAR; INTRAVENOUS EVERY 4 HOURS PRN
Status: DISCONTINUED | OUTPATIENT
Start: 2018-01-01 | End: 2018-01-01 | Stop reason: HOSPADM

## 2018-01-01 RX ORDER — METOCLOPRAMIDE HYDROCHLORIDE 5 MG/ML
10 INJECTION INTRAMUSCULAR; INTRAVENOUS
Status: DISCONTINUED | OUTPATIENT
Start: 2018-01-01 | End: 2018-01-01

## 2018-01-01 RX ORDER — ALBUTEROL SULFATE 90 UG/1
2 AEROSOL, METERED RESPIRATORY (INHALATION) EVERY 6 HOURS PRN
Qty: 1 INHALER | Refills: 0 | Status: SHIPPED | OUTPATIENT
Start: 2018-01-01 | End: 2018-01-01

## 2018-01-01 RX ORDER — HYDROMORPHONE HYDROCHLORIDE 2 MG/ML
0.2 INJECTION, SOLUTION INTRAMUSCULAR; INTRAVENOUS; SUBCUTANEOUS
Status: DISCONTINUED | OUTPATIENT
Start: 2018-01-01 | End: 2018-01-01 | Stop reason: HOSPADM

## 2018-01-01 RX ORDER — MORPHINE SULFATE 15 MG/1
15 TABLET, FILM COATED, EXTENDED RELEASE ORAL ONCE
Status: COMPLETED | OUTPATIENT
Start: 2018-01-01 | End: 2018-01-01

## 2018-01-01 RX ORDER — MORPHINE SULFATE 15 MG/1
15 TABLET, FILM COATED, EXTENDED RELEASE ORAL EVERY 12 HOURS
Status: DISCONTINUED | OUTPATIENT
Start: 2018-01-01 | End: 2018-01-01

## 2018-01-01 RX ORDER — HALOPERIDOL 5 MG/ML
2-5 INJECTION INTRAMUSCULAR EVERY 4 HOURS PRN
Status: DISCONTINUED | OUTPATIENT
Start: 2018-01-01 | End: 2018-01-01 | Stop reason: HOSPADM

## 2018-01-01 RX ORDER — MORPHINE SULFATE 30 MG/1
30 TABLET, FILM COATED, EXTENDED RELEASE ORAL EVERY 12 HOURS
Status: DISCONTINUED | OUTPATIENT
Start: 2018-01-01 | End: 2018-01-01

## 2018-01-01 RX ORDER — OXYCODONE HYDROCHLORIDE 10 MG/1
10 TABLET ORAL
Status: DISCONTINUED | OUTPATIENT
Start: 2018-01-01 | End: 2018-01-01 | Stop reason: HOSPADM

## 2018-01-01 RX ORDER — MORPHINE SULFATE 4 MG/ML
4 INJECTION, SOLUTION INTRAMUSCULAR; INTRAVENOUS ONCE
Status: COMPLETED | OUTPATIENT
Start: 2018-01-01 | End: 2018-01-01

## 2018-01-01 RX ORDER — DOCUSATE SODIUM 100 MG/1
100 CAPSULE, LIQUID FILLED ORAL 2 TIMES DAILY
Qty: 60 CAP | Refills: 0 | Status: SHIPPED | OUTPATIENT
Start: 2018-01-01 | End: 2018-01-01

## 2018-01-01 RX ORDER — HYDRALAZINE HYDROCHLORIDE 20 MG/ML
5 INJECTION INTRAMUSCULAR; INTRAVENOUS
Status: DISCONTINUED | OUTPATIENT
Start: 2018-01-01 | End: 2018-01-01 | Stop reason: HOSPADM

## 2018-01-01 RX ORDER — ONDANSETRON 2 MG/ML
4 INJECTION INTRAMUSCULAR; INTRAVENOUS
Status: DISCONTINUED | OUTPATIENT
Start: 2018-01-01 | End: 2018-01-01 | Stop reason: HOSPADM

## 2018-01-01 RX ORDER — MORPHINE SULFATE 15 MG/1
TABLET, FILM COATED, EXTENDED RELEASE ORAL
Status: COMPLETED
Start: 2018-01-01 | End: 2018-01-01

## 2018-01-01 RX ORDER — ONDANSETRON 2 MG/ML
4 INJECTION INTRAMUSCULAR; INTRAVENOUS ONCE
Status: COMPLETED | OUTPATIENT
Start: 2018-01-01 | End: 2018-01-01

## 2018-01-01 RX ORDER — NEOSTIGMINE METHYLSULFATE 1 MG/ML
2 INJECTION, SOLUTION INTRAVENOUS ONCE
Status: DISCONTINUED | OUTPATIENT
Start: 2018-01-01 | End: 2018-01-01

## 2018-01-01 RX ORDER — POLYETHYLENE GLYCOL 3350 17 G/17G
1 POWDER, FOR SOLUTION ORAL 2 TIMES DAILY
Status: DISCONTINUED | OUTPATIENT
Start: 2018-01-01 | End: 2018-01-01 | Stop reason: HOSPADM

## 2018-01-01 RX ORDER — OXYCODONE HYDROCHLORIDE 5 MG/1
5 TABLET ORAL
Status: DISCONTINUED | OUTPATIENT
Start: 2018-01-01 | End: 2018-01-01 | Stop reason: HOSPADM

## 2018-01-01 RX ORDER — SODIUM CHLORIDE, SODIUM LACTATE, POTASSIUM CHLORIDE, CALCIUM CHLORIDE 600; 310; 30; 20 MG/100ML; MG/100ML; MG/100ML; MG/100ML
INJECTION, SOLUTION INTRAVENOUS CONTINUOUS
Status: DISCONTINUED | OUTPATIENT
Start: 2018-01-01 | End: 2018-01-01 | Stop reason: HOSPADM

## 2018-01-01 RX ORDER — DOCUSATE SODIUM 100 MG/1
100 CAPSULE, LIQUID FILLED ORAL 2 TIMES DAILY
Qty: 60 CAP | Refills: 0 | Status: SHIPPED | OUTPATIENT
Start: 2018-01-01

## 2018-01-01 RX ORDER — LABETALOL HYDROCHLORIDE 5 MG/ML
5 INJECTION, SOLUTION INTRAVENOUS
Status: DISCONTINUED | OUTPATIENT
Start: 2018-01-01 | End: 2018-01-01 | Stop reason: HOSPADM

## 2018-01-01 RX ORDER — SODIUM CHLORIDE 9 MG/ML
1000 INJECTION, SOLUTION INTRAVENOUS ONCE
Status: COMPLETED | OUTPATIENT
Start: 2018-01-01 | End: 2018-01-01

## 2018-01-01 RX ORDER — OXYCODONE HYDROCHLORIDE 5 MG/1
5 TABLET ORAL
Status: DISCONTINUED | OUTPATIENT
Start: 2018-01-01 | End: 2018-01-01

## 2018-01-01 RX ORDER — OXYCODONE HCL 5 MG/5 ML
5 SOLUTION, ORAL ORAL
Status: DISCONTINUED | OUTPATIENT
Start: 2018-01-01 | End: 2018-01-01 | Stop reason: HOSPADM

## 2018-01-01 RX ORDER — IPRATROPIUM BROMIDE AND ALBUTEROL SULFATE 2.5; .5 MG/3ML; MG/3ML
3 SOLUTION RESPIRATORY (INHALATION) ONCE
Status: COMPLETED | OUTPATIENT
Start: 2018-01-01 | End: 2018-01-01

## 2018-01-01 RX ORDER — IBUPROFEN 200 MG
400 TABLET ORAL EVERY 6 HOURS PRN
Status: SHIPPED | COMMUNITY
End: 2018-01-01

## 2018-01-01 RX ORDER — HYDROCODONE BITARTRATE AND ACETAMINOPHEN 5; 325 MG/1; MG/1
1 TABLET ORAL EVERY 12 HOURS PRN
Qty: 30 TAB | Refills: 0 | Status: SHIPPED | OUTPATIENT
Start: 2018-01-01 | End: 2018-01-01

## 2018-01-01 RX ORDER — CHLORAL HYDRATE 500 MG
1000 CAPSULE ORAL DAILY
Status: SHIPPED | COMMUNITY
End: 2018-01-01

## 2018-01-01 RX ORDER — HYDROCODONE BITARTRATE AND ACETAMINOPHEN 5; 325 MG/1; MG/1
1-2 TABLET ORAL EVERY 6 HOURS PRN
Qty: 10 TAB | Refills: 0 | Status: SHIPPED | OUTPATIENT
Start: 2018-01-01 | End: 2018-01-01 | Stop reason: SDUPTHER

## 2018-01-01 RX ORDER — SODIUM CHLORIDE 9 MG/ML
1000 INJECTION, SOLUTION INTRAVENOUS CONTINUOUS
Status: ACTIVE | OUTPATIENT
Start: 2018-01-01 | End: 2018-01-01

## 2018-01-01 RX ORDER — METHYLPREDNISOLONE 4 MG/1
4 TABLET ORAL DAILY
Qty: 1 KIT | Refills: 0 | Status: SHIPPED | OUTPATIENT
Start: 2018-01-01 | End: 2018-01-01

## 2018-01-01 RX ORDER — OXYCODONE HCL 5 MG/5 ML
10 SOLUTION, ORAL ORAL
Status: DISCONTINUED | OUTPATIENT
Start: 2018-01-01 | End: 2018-01-01 | Stop reason: HOSPADM

## 2018-01-01 RX ORDER — HYDROMORPHONE HYDROCHLORIDE 2 MG/ML
0.25 INJECTION, SOLUTION INTRAMUSCULAR; INTRAVENOUS; SUBCUTANEOUS
Status: DISCONTINUED | OUTPATIENT
Start: 2018-01-01 | End: 2018-01-01

## 2018-01-01 RX ORDER — POTASSIUM CHLORIDE 20 MEQ/1
40 TABLET, EXTENDED RELEASE ORAL ONCE
Status: COMPLETED | OUTPATIENT
Start: 2018-01-01 | End: 2018-01-01

## 2018-01-01 RX ORDER — METOCLOPRAMIDE HYDROCHLORIDE 5 MG/ML
5 INJECTION INTRAMUSCULAR; INTRAVENOUS EVERY 6 HOURS
Status: DISCONTINUED | OUTPATIENT
Start: 2018-01-01 | End: 2018-01-01

## 2018-01-01 RX ORDER — LISINOPRIL 5 MG/1
10 TABLET ORAL
Status: DISCONTINUED | OUTPATIENT
Start: 2018-01-01 | End: 2018-01-01

## 2018-01-01 RX ORDER — HYDROMORPHONE HYDROCHLORIDE 2 MG/ML
0.1 INJECTION, SOLUTION INTRAMUSCULAR; INTRAVENOUS; SUBCUTANEOUS
Status: DISCONTINUED | OUTPATIENT
Start: 2018-01-01 | End: 2018-01-01 | Stop reason: HOSPADM

## 2018-01-01 RX ORDER — MORPHINE SULFATE 30 MG/1
30 TABLET, FILM COATED, EXTENDED RELEASE ORAL EVERY 12 HOURS
Status: DISCONTINUED | OUTPATIENT
Start: 2018-01-01 | End: 2018-01-01 | Stop reason: HOSPADM

## 2018-01-01 RX ORDER — HALOPERIDOL 5 MG/ML
1 INJECTION INTRAMUSCULAR
Status: DISCONTINUED | OUTPATIENT
Start: 2018-01-01 | End: 2018-01-01 | Stop reason: HOSPADM

## 2018-01-01 RX ORDER — LACTULOSE 10 G/15ML
300 SOLUTION ORAL ONCE
Status: DISPENSED | OUTPATIENT
Start: 2018-01-01 | End: 2018-01-01

## 2018-01-01 RX ORDER — MEPERIDINE HYDROCHLORIDE 25 MG/ML
25 INJECTION INTRAMUSCULAR; INTRAVENOUS; SUBCUTANEOUS
Status: DISCONTINUED | OUTPATIENT
Start: 2018-01-01 | End: 2018-01-01 | Stop reason: HOSPADM

## 2018-01-01 RX ORDER — LACTULOSE 20 G/30ML
30 SOLUTION ORAL 3 TIMES DAILY
Status: DISCONTINUED | OUTPATIENT
Start: 2018-01-01 | End: 2018-01-01

## 2018-01-01 RX ORDER — HYDROMORPHONE HYDROCHLORIDE 2 MG/ML
0.4 INJECTION, SOLUTION INTRAMUSCULAR; INTRAVENOUS; SUBCUTANEOUS
Status: DISCONTINUED | OUTPATIENT
Start: 2018-01-01 | End: 2018-01-01 | Stop reason: HOSPADM

## 2018-01-01 RX ORDER — METOCLOPRAMIDE HYDROCHLORIDE 5 MG/ML
10 INJECTION INTRAMUSCULAR; INTRAVENOUS EVERY 6 HOURS
Status: DISCONTINUED | OUTPATIENT
Start: 2018-01-01 | End: 2018-01-01 | Stop reason: HOSPADM

## 2018-01-01 RX ORDER — MIDAZOLAM HYDROCHLORIDE 1 MG/ML
0.5 INJECTION INTRAMUSCULAR; INTRAVENOUS
Status: DISCONTINUED | OUTPATIENT
Start: 2018-01-01 | End: 2018-01-01 | Stop reason: HOSPADM

## 2018-01-01 RX ORDER — SODIUM CHLORIDE 9 MG/ML
INJECTION, SOLUTION INTRAVENOUS CONTINUOUS
Status: DISCONTINUED | OUTPATIENT
Start: 2018-01-01 | End: 2018-01-01 | Stop reason: HOSPADM

## 2018-01-01 RX ADMIN — POLYETHYLENE GLYCOL 3350 1 PACKET: 17 POWDER, FOR SOLUTION ORAL at 05:50

## 2018-01-01 RX ADMIN — HYDROMORPHONE HYDROCHLORIDE 0.25 MG: 2 INJECTION, SOLUTION INTRAMUSCULAR; INTRAVENOUS; SUBCUTANEOUS at 02:12

## 2018-01-01 RX ADMIN — METOCLOPRAMIDE 10 MG: 5 INJECTION, SOLUTION INTRAMUSCULAR; INTRAVENOUS at 04:51

## 2018-01-01 RX ADMIN — OXYCODONE HYDROCHLORIDE 5 MG: 5 TABLET ORAL at 10:03

## 2018-01-01 RX ADMIN — SODIUM CHLORIDE: 9 INJECTION, SOLUTION INTRAVENOUS at 11:45

## 2018-01-01 RX ADMIN — Medication: at 17:37

## 2018-01-01 RX ADMIN — ONDANSETRON HYDROCHLORIDE 4 MG: 2 INJECTION INTRAMUSCULAR; INTRAVENOUS at 11:31

## 2018-01-01 RX ADMIN — ONDANSETRON 4 MG: 2 INJECTION INTRAMUSCULAR; INTRAVENOUS at 15:07

## 2018-01-01 RX ADMIN — SODIUM CHLORIDE: 9 INJECTION, SOLUTION INTRAVENOUS at 11:01

## 2018-01-01 RX ADMIN — MORPHINE SULFATE 30 MG: 30 TABLET, EXTENDED RELEASE ORAL at 17:51

## 2018-01-01 RX ADMIN — SODIUM CHLORIDE: 9 INJECTION, SOLUTION INTRAVENOUS at 03:10

## 2018-01-01 RX ADMIN — ONDANSETRON HYDROCHLORIDE 4 MG: 2 INJECTION INTRAMUSCULAR; INTRAVENOUS at 08:04

## 2018-01-01 RX ADMIN — SIMETHICONE CHEW TAB 80 MG 80 MG: 80 TABLET ORAL at 20:17

## 2018-01-01 RX ADMIN — HYDROMORPHONE HYDROCHLORIDE 1 MG: 2 INJECTION, SOLUTION INTRAMUSCULAR; INTRAVENOUS; SUBCUTANEOUS at 10:32

## 2018-01-01 RX ADMIN — METOCLOPRAMIDE 5 MG: 5 INJECTION, SOLUTION INTRAMUSCULAR; INTRAVENOUS at 11:01

## 2018-01-01 RX ADMIN — METOCLOPRAMIDE 10 MG: 5 INJECTION, SOLUTION INTRAMUSCULAR; INTRAVENOUS at 16:11

## 2018-01-01 RX ADMIN — METOCLOPRAMIDE 5 MG: 5 INJECTION, SOLUTION INTRAMUSCULAR; INTRAVENOUS at 11:44

## 2018-01-01 RX ADMIN — MORPHINE SULFATE 15 MG: 15 TABLET, EXTENDED RELEASE ORAL at 09:27

## 2018-01-01 RX ADMIN — SODIUM CHLORIDE 1000 ML: 9 INJECTION, SOLUTION INTRAVENOUS at 13:41

## 2018-01-01 RX ADMIN — IPRATROPIUM BROMIDE AND ALBUTEROL SULFATE 3 ML: 2.5; .5 SOLUTION RESPIRATORY (INHALATION) at 17:17

## 2018-01-01 RX ADMIN — METOCLOPRAMIDE 5 MG: 5 INJECTION, SOLUTION INTRAMUSCULAR; INTRAVENOUS at 11:45

## 2018-01-01 RX ADMIN — MORPHINE SULFATE 15 MG: 15 TABLET, EXTENDED RELEASE ORAL at 13:42

## 2018-01-01 RX ADMIN — LISINOPRIL 10 MG: 5 TABLET ORAL at 10:02

## 2018-01-01 RX ADMIN — SODIUM CHLORIDE: 9 INJECTION, SOLUTION INTRAVENOUS at 17:38

## 2018-01-01 RX ADMIN — SODIUM CHLORIDE: 9 INJECTION, SOLUTION INTRAVENOUS at 01:30

## 2018-01-01 RX ADMIN — METOCLOPRAMIDE 5 MG: 5 INJECTION, SOLUTION INTRAMUSCULAR; INTRAVENOUS at 17:04

## 2018-01-01 RX ADMIN — Medication: at 23:12

## 2018-01-01 RX ADMIN — HYDROMORPHONE HYDROCHLORIDE 0.5 MG: 2 INJECTION, SOLUTION INTRAMUSCULAR; INTRAVENOUS; SUBCUTANEOUS at 15:11

## 2018-01-01 RX ADMIN — HYDROMORPHONE HYDROCHLORIDE 0.25 MG: 2 INJECTION, SOLUTION INTRAMUSCULAR; INTRAVENOUS; SUBCUTANEOUS at 15:02

## 2018-01-01 RX ADMIN — OXYCODONE HYDROCHLORIDE 5 MG: 5 TABLET ORAL at 20:17

## 2018-01-01 RX ADMIN — MORPHINE SULFATE 15 MG: 15 TABLET, EXTENDED RELEASE ORAL at 17:45

## 2018-01-01 RX ADMIN — SODIUM CHLORIDE 1000 ML: 9 INJECTION, SOLUTION INTRAVENOUS at 08:37

## 2018-01-01 RX ADMIN — LISINOPRIL 10 MG: 5 TABLET ORAL at 05:23

## 2018-01-01 RX ADMIN — SODIUM CHLORIDE: 9 INJECTION, SOLUTION INTRAVENOUS at 19:48

## 2018-01-01 RX ADMIN — SODIUM CHLORIDE 1000 ML: 9 INJECTION, SOLUTION INTRAVENOUS at 11:31

## 2018-01-01 RX ADMIN — Medication: at 21:01

## 2018-01-01 RX ADMIN — OXYCODONE HYDROCHLORIDE 5 MG: 5 TABLET ORAL at 02:48

## 2018-01-01 RX ADMIN — OXYCODONE HYDROCHLORIDE 5 MG: 5 TABLET ORAL at 18:05

## 2018-01-01 RX ADMIN — Medication: at 12:10

## 2018-01-01 RX ADMIN — HYDROMORPHONE HYDROCHLORIDE 0.25 MG: 2 INJECTION, SOLUTION INTRAMUSCULAR; INTRAVENOUS; SUBCUTANEOUS at 18:53

## 2018-01-01 RX ADMIN — HYDROMORPHONE HYDROCHLORIDE 0.25 MG: 2 INJECTION, SOLUTION INTRAMUSCULAR; INTRAVENOUS; SUBCUTANEOUS at 22:03

## 2018-01-01 RX ADMIN — MORPHINE SULFATE 15 MG: 15 TABLET, EXTENDED RELEASE ORAL at 05:51

## 2018-01-01 RX ADMIN — SODIUM CHLORIDE 100 ML: 9 INJECTION, SOLUTION INTRAVENOUS at 08:01

## 2018-01-01 RX ADMIN — ONDANSETRON 4 MG: 2 INJECTION INTRAMUSCULAR; INTRAVENOUS at 23:11

## 2018-01-01 RX ADMIN — IOHEXOL 50 ML: 240 INJECTION, SOLUTION INTRATHECAL; INTRAVASCULAR; INTRAVENOUS; ORAL at 11:45

## 2018-01-01 RX ADMIN — MORPHINE SULFATE 30 MG: 30 TABLET, EXTENDED RELEASE ORAL at 17:38

## 2018-01-01 RX ADMIN — METOCLOPRAMIDE 5 MG: 5 INJECTION, SOLUTION INTRAMUSCULAR; INTRAVENOUS at 22:19

## 2018-01-01 RX ADMIN — ONDANSETRON 4 MG: 2 INJECTION INTRAMUSCULAR; INTRAVENOUS at 03:10

## 2018-01-01 RX ADMIN — SIMETHICONE CHEW TAB 80 MG 80 MG: 80 TABLET ORAL at 11:01

## 2018-01-01 RX ADMIN — SODIUM CHLORIDE: 9 INJECTION, SOLUTION INTRAVENOUS at 02:38

## 2018-01-01 RX ADMIN — SODIUM CHLORIDE: 9 INJECTION, SOLUTION INTRAVENOUS at 22:50

## 2018-01-01 RX ADMIN — MORPHINE SULFATE 30 MG: 30 TABLET, EXTENDED RELEASE ORAL at 04:51

## 2018-01-01 RX ADMIN — MORPHINE SULFATE 15 MG: 15 TABLET, EXTENDED RELEASE ORAL at 06:20

## 2018-01-01 RX ADMIN — METOCLOPRAMIDE 10 MG: 5 INJECTION, SOLUTION INTRAMUSCULAR; INTRAVENOUS at 17:51

## 2018-01-01 RX ADMIN — HYDROMORPHONE HYDROCHLORIDE 0.5 MG: 2 INJECTION, SOLUTION INTRAMUSCULAR; INTRAVENOUS; SUBCUTANEOUS at 05:23

## 2018-01-01 RX ADMIN — OXYCODONE HYDROCHLORIDE 5 MG: 5 TABLET ORAL at 14:00

## 2018-01-01 RX ADMIN — Medication: at 12:14

## 2018-01-01 RX ADMIN — METOCLOPRAMIDE 10 MG: 5 INJECTION, SOLUTION INTRAMUSCULAR; INTRAVENOUS at 23:06

## 2018-01-01 RX ADMIN — LACTULOSE 30 ML: 20 SOLUTION ORAL at 14:00

## 2018-01-01 RX ADMIN — ONDANSETRON 4 MG: 2 INJECTION INTRAMUSCULAR; INTRAVENOUS at 03:06

## 2018-01-01 RX ADMIN — ONDANSETRON 4 MG: 2 INJECTION INTRAMUSCULAR; INTRAVENOUS at 11:01

## 2018-01-01 RX ADMIN — HYDROMORPHONE HYDROCHLORIDE 1 MG: 1 INJECTION, SOLUTION INTRAMUSCULAR; INTRAVENOUS; SUBCUTANEOUS at 11:31

## 2018-01-01 RX ADMIN — HYDROMORPHONE HYDROCHLORIDE 0.25 MG: 2 INJECTION, SOLUTION INTRAMUSCULAR; INTRAVENOUS; SUBCUTANEOUS at 05:50

## 2018-01-01 RX ADMIN — ONDANSETRON 4 MG: 2 INJECTION INTRAMUSCULAR; INTRAVENOUS at 10:32

## 2018-01-01 RX ADMIN — OXYCODONE HYDROCHLORIDE 5 MG: 5 TABLET ORAL at 03:25

## 2018-01-01 RX ADMIN — MORPHINE SULFATE 15 MG: 15 TABLET, EXTENDED RELEASE ORAL at 17:04

## 2018-01-01 RX ADMIN — HYDROMORPHONE HYDROCHLORIDE 0.25 MG: 2 INJECTION, SOLUTION INTRAMUSCULAR; INTRAVENOUS; SUBCUTANEOUS at 12:23

## 2018-01-01 RX ADMIN — METHYLNALTREXONE BROMIDE 12 MG: 12 INJECTION, SOLUTION SUBCUTANEOUS at 15:52

## 2018-01-01 RX ADMIN — IOHEXOL 100 ML: 350 INJECTION, SOLUTION INTRAVENOUS at 09:27

## 2018-01-01 RX ADMIN — SODIUM CHLORIDE: 9 INJECTION, SOLUTION INTRAVENOUS at 22:19

## 2018-01-01 RX ADMIN — ONDANSETRON 4 MG: 2 INJECTION INTRAMUSCULAR; INTRAVENOUS at 05:39

## 2018-01-01 RX ADMIN — IOHEXOL 100 ML: 350 INJECTION, SOLUTION INTRAVENOUS at 12:29

## 2018-01-01 RX ADMIN — MORPHINE SULFATE 15 MG: 15 TABLET, EXTENDED RELEASE ORAL at 05:19

## 2018-01-01 RX ADMIN — HYDROMORPHONE HYDROCHLORIDE 0.5 MG: 2 INJECTION, SOLUTION INTRAMUSCULAR; INTRAVENOUS; SUBCUTANEOUS at 19:14

## 2018-01-01 RX ADMIN — HYDROMORPHONE HYDROCHLORIDE 0.25 MG: 2 INJECTION, SOLUTION INTRAMUSCULAR; INTRAVENOUS; SUBCUTANEOUS at 09:04

## 2018-01-01 RX ADMIN — METOCLOPRAMIDE 5 MG: 5 INJECTION, SOLUTION INTRAMUSCULAR; INTRAVENOUS at 17:34

## 2018-01-01 RX ADMIN — METOCLOPRAMIDE 5 MG: 5 INJECTION, SOLUTION INTRAMUSCULAR; INTRAVENOUS at 23:28

## 2018-01-01 RX ADMIN — SODIUM CHLORIDE: 9 INJECTION, SOLUTION INTRAVENOUS at 08:43

## 2018-01-01 RX ADMIN — MORPHINE SULFATE 4 MG: 4 INJECTION INTRAVENOUS at 08:04

## 2018-01-01 RX ADMIN — MORPHINE SULFATE 30 MG: 30 TABLET, EXTENDED RELEASE ORAL at 05:23

## 2018-01-01 RX ADMIN — METOCLOPRAMIDE 5 MG: 5 INJECTION, SOLUTION INTRAMUSCULAR; INTRAVENOUS at 05:19

## 2018-01-01 RX ADMIN — OXYCODONE HYDROCHLORIDE 5 MG: 5 TABLET ORAL at 07:11

## 2018-01-01 RX ADMIN — SIMETHICONE CHEW TAB 80 MG 80 MG: 80 TABLET ORAL at 17:04

## 2018-01-01 RX ADMIN — POTASSIUM CHLORIDE 20 MEQ: 1500 TABLET, EXTENDED RELEASE ORAL at 10:06

## 2018-01-01 SDOH — ECONOMIC STABILITY: HOUSING INSECURITY: UNSAFE APPLIANCES: 0

## 2018-01-01 SDOH — ECONOMIC STABILITY: HOUSING INSECURITY: UNSAFE COOKING RANGE AREA: 0

## 2018-01-01 ASSESSMENT — COGNITIVE AND FUNCTIONAL STATUS - GENERAL
MOBILITY SCORE: 24
DAILY ACTIVITIY SCORE: 24
SUGGESTED CMS G CODE MODIFIER MOBILITY: CH
SUGGESTED CMS G CODE MODIFIER DAILY ACTIVITY: CH
DAILY ACTIVITIY SCORE: 24
SUGGESTED CMS G CODE MODIFIER DAILY ACTIVITY: CH

## 2018-01-01 ASSESSMENT — ENCOUNTER SYMPTOMS
DEPRESSION: 0
PSYCHIATRIC NEGATIVE: 1
DOUBLE VISION: 0
EYE PAIN: 0
BACK PAIN: 1
BLURRED VISION: 0
PALPITATIONS: 0
WHEEZING: 1
PALPITATIONS: 0
DIAPHORESIS: 1
CONSTIPATION: 1
TINGLING: 0
NEUROLOGICAL NEGATIVE: 1
EYES NEGATIVE: 1
MYALGIAS: 0
NECK PAIN: 0
VOMITING: 0
FATIGUE: 1
FATIGUES EASILY: 1
STOOL FREQUENCY: LESS THAN DAILY
COUGH: 0
ABDOMINAL PAIN: 1
SORE THROAT: 0
HEMOPTYSIS: 0
NAUSEA: 1
SHORTNESS OF BREATH: 0
INSOMNIA: 0
BLOOD IN STOOL: 0
NEUROLOGICAL NEGATIVE: 1
FALLS: 0
NAUSEA: 0
BLURRED VISION: 0
FEVER: 0
BACK PAIN: 0
WEIGHT LOSS: 1
BELCHING: 1
CLAUDICATION: 0
FEVER: 0
NAUSEA: 1
NAUSEA: 1
BACK PAIN: 0
FLANK PAIN: 1
MYALGIAS: 0
CONSTIPATION: 0
CONSTIPATION: 0
CONSTITUTIONAL NEGATIVE: 1
PSYCHIATRIC NEGATIVE: 1
TINGLING: 0
EYES NEGATIVE: 1
DIZZINESS: 0
TINGLING: 0
COUGH: 0
FEVER: 0
SORE THROAT: 0
CHILLS: 1
COUGH: 1
SPUTUM PRODUCTION: 0
RESPIRATORY NEGATIVE: 1
TREMORS: 0
SLEEP QUALITY: POOR
EYE DISCHARGE: 0
WEIGHT LOSS: 1
EYE PAIN: 0
PSYCHIATRIC NEGATIVE: 1
DIARRHEA: 0
HEARTBURN: 0
DIARRHEA: 0
SORE THROAT: 0
CHILLS: 0
HEARTBURN: 0
VOMITING: 0
FALLS: 0
INSOMNIA: 0
NEUROLOGICAL NEGATIVE: 1
WHEEZING: 0
TINGLING: 0
DEPRESSION: 0
NECK PAIN: 0
NECK PAIN: 0
WEIGHT LOSS: 1
NECK PAIN: 0
WEAKNESS: 0
EYE REDNESS: 0
DIZZINESS: 0
EYE PAIN: 0
EYE PAIN: 0
COUGH: 0
EYE PAIN: 0
ABDOMINAL PAIN: 1
BLOOD IN STOOL: 0
CHILLS: 0
ORTHOPNEA: 0
FATIGUES EASILY: 1
CONSTIPATION: 0
NAUSEA: 1
BLURRED VISION: 0
CONSTIPATION: 1
DIZZINESS: 0
SHORTNESS OF BREATH: 0
SENSORY CHANGE: 0
BLOOD IN STOOL: 0
TREMORS: 0
NAUSEA: 0
SENSORY CHANGE: 0
SORE THROAT: 0
ABDOMINAL PAIN: 1
FATIGUE: 1
NAUSEA: 1
NAUSEA: 1
BACK PAIN: 0
HEMOPTYSIS: 0
SPUTUM PRODUCTION: 1
HEADACHES: 0
LOWER EXTREMITY EDEMA: 1
CLAUDICATION: 0
BLURRED VISION: 0
VOMITING: 1
DRY SKIN: 1
BACK PAIN: 0
DIZZINESS: 0
NAUSEA: 1
STOOL FREQUENCY: LESS THAN DAILY
NECK PAIN: 0
HEARTBURN: 0
NECK PAIN: 0
NAUSEA: 1
PALPITATIONS: 0
FATIGUES EASILY: 1
CONSTIPATION: 0
DEPRESSION: 0
HYPERTENSION: 1
NAUSEA: 1
CONSTIPATION: 1
LAST BOWEL MOVEMENT: 64937
ANOREXIA: 0
WHEEZING: 0
PSYCHIATRIC NEGATIVE: 1
BACK PAIN: 0
DIZZINESS: 0
SPEECH CHANGE: 0
EYES NEGATIVE: 1
SLEEP QUALITY: FAIR
DIARRHEA: 0
CONSTIPATION: 1
ANOREXIA: 1
DIARRHEA: 0
CONSTIPATION: 1
SHORTNESS OF BREATH: 1
FEVER: 0
INSOMNIA: 0
TINGLING: 0
HEARTBURN: 0
EYE PAIN: 0
ABDOMINAL PAIN: 1
STRIDOR: 0
CONSTIPATION: 1
CARDIOVASCULAR NEGATIVE: 1
VOMITING: 0
DIZZINESS: 0
NAUSEA: 0
PALPITATIONS: 0
BLURRED VISION: 0
ABDOMINAL PAIN: 1
SHORTNESS OF BREATH: 1
VOMITING: 0
SLEEP QUALITY: FAIR
VOMITING: 0
HEMATOCHEZIA: 0
RESPIRATORY NEGATIVE: 1
SORE THROAT: 0
SHORTNESS OF BREATH: 0
LAST BOWEL MOVEMENT: 64932
ABDOMINAL PAIN: 1
NAUSEA: 0
NERVOUS/ANXIOUS: 0
FEVER: 0
MYALGIAS: 0
FLANK PAIN: 0
DIZZINESS: 0
CONSTIPATION: 0
CONSTITUTIONAL NEGATIVE: 1
HEARTBURN: 0
ABDOMINAL PAIN: 1
DIARRHEA: 0
CHILLS: 0
SHORTNESS OF BREATH: 0
INSOMNIA: 1
LOWER EXTREMITY EDEMA: 1
VOMITING: 0
VOMITING: 0
DEPRESSION: 0
NAUSEA: 1
VOMITING: 0
MUSCULOSKELETAL NEGATIVE: 1
FEVER: 0
SORE THROAT: 0
HEADACHES: 0
FEVER: 0
ABDOMINAL PAIN: 1
DOUBLE VISION: 0
DEPRESSION: 0
TINGLING: 0
ABDOMINAL PAIN: 1
FLATUS: 1
SORE THROAT: 0
ABDOMINAL PAIN: 1
SPUTUM PRODUCTION: 0
SHORTNESS OF BREATH: 0
CONSTIPATION: 1
TINGLING: 0
CONSTITUTIONAL NEGATIVE: 1
VOMITING: 0
WEIGHT LOSS: 1
PHOTOPHOBIA: 0
DYSPNEA ON EXERTION: 1
DEPRESSION: 0
FEVER: 0
NAUSEA: 0
DYSPNEA ACTIVITY LEVEL: AFTER AMBULATING LESS THAN 10 FT
SPUTUM PRODUCTION: 1
VOMITING: 0
COUGH: 0
PND: 0
FEVER: 0
ABDOMINAL PAIN: 1
STRIDOR: 0
BLOOD IN STOOL: 0
MEMORY LOSS: 0
ABDOMINAL PAIN: 1
CHILLS: 0
ABDOMINAL PAIN: 1
BLOOD IN STOOL: 0
SEIZURES: 0
CONSTIPATION: 0
PALPITATIONS: 0
COUGH: 1
ABDOMINAL PAIN: 1
HEADACHES: 0
COUGH: 0
SORE THROAT: 0
LOWER EXTREMITY EDEMA: 1
DIARRHEA: 0
SINUS PAIN: 0
CHILLS: 0
PHOTOPHOBIA: 0
MUSCULOSKELETAL NEGATIVE: 1
ABDOMINAL PAIN: 1
BLOOD IN STOOL: 0
FLATUS: 0
WHEEZING: 0
WEAKNESS: 1
MYALGIAS: 0
FLATUS: 0
MUSCULOSKELETAL NEGATIVE: 1
SHORTNESS OF BREATH: 0
SHORTNESS OF BREATH: 0
FEVER: 0
STOOL FREQUENCY: LESS THAN DAILY
LOSS OF CONSCIOUSNESS: 0
EYE PAIN: 0
HEARTBURN: 0
EYES NEGATIVE: 1
SLEEP QUALITY: FAIR
CARDIOVASCULAR NEGATIVE: 1
INSOMNIA: 0
DIZZINESS: 0
ORTHOPNEA: 0
VOMITING: 0
NAUSEA: 0
CONSTITUTIONAL NEGATIVE: 1
SHORTNESS OF BREATH: 1
SORE THROAT: 0
BLURRED VISION: 0
CHILLS: 0
STOOL FREQUENCY: LESS THAN DAILY
CARDIOVASCULAR NEGATIVE: 1
EYE REDNESS: 0
COUGH: 0
CHILLS: 0
MUSCULOSKELETAL NEGATIVE: 1
CARDIOVASCULAR NEGATIVE: 1
NEUROLOGICAL NEGATIVE: 1
RESPIRATORY NEGATIVE: 1
SHORTNESS OF BREATH: 0
PALPITATIONS: 0
DEPRESSION: 0
DIARRHEA: 0
STOOL FREQUENCY: LESS THAN DAILY
INSOMNIA: 0
ABDOMINAL PAIN: 1
DIZZINESS: 0
CHILLS: 0
PALPITATIONS: 0
DIZZINESS: 0
BLOOD IN STOOL: 0
RESPIRATORY NEGATIVE: 1
SLEEP QUALITY: POOR
DRY SKIN: 1
BACK PAIN: 0
SHORTNESS OF BREATH: 0
COUGH: 0
HEADACHES: 0
NAUSEA: 0
ABDOMINAL PAIN: 1

## 2018-01-01 ASSESSMENT — PAIN SCALES - GENERAL
PAINLEVEL_OUTOF10: 7
PAINLEVEL_OUTOF10: 6
PAINLEVEL_OUTOF10: 8
PAINLEVEL_OUTOF10: 6
PAINLEVEL_OUTOF10: 2
PAINLEVEL_OUTOF10: 8
PAINLEVEL_OUTOF10: 6
PAINLEVEL_OUTOF10: 0
PAINLEVEL_OUTOF10: 0
PAINLEVEL_OUTOF10: 5
PAINLEVEL_OUTOF10: 6
PAINLEVEL_OUTOF10: 5
PAINLEVEL_OUTOF10: 7
PAINLEVEL_OUTOF10: 2
PAINLEVEL: 9=SEVERE PAIN
PAINLEVEL_OUTOF10: 4
PAINLEVEL_OUTOF10: 6
PAINLEVEL_OUTOF10: 4
PAINLEVEL: NO PAIN
PAINLEVEL_OUTOF10: 8
PAINLEVEL_OUTOF10: 8
PAINLEVEL_OUTOF10: 4
PAINLEVEL_OUTOF10: 7
PAINLEVEL_OUTOF10: 6
PAINLEVEL_OUTOF10: 7
PAINLEVEL_OUTOF10: 5
PAINLEVEL_OUTOF10: 4
PAINLEVEL_OUTOF10: 4
PAINLEVEL_OUTOF10: 7
PAINLEVEL_OUTOF10: 8

## 2018-01-01 ASSESSMENT — ACTIVITIES OF DAILY LIVING (ADL)
MONEY MANAGEMENT (EXPENSES/BILLS): INDEPENDENT

## 2018-01-01 ASSESSMENT — PATIENT HEALTH QUESTIONNAIRE - PHQ9
CLINICAL INTERPRETATION OF PHQ2 SCORE: 0
1. LITTLE INTEREST OR PLEASURE IN DOING THINGS: NOT AT ALL
1. LITTLE INTEREST OR PLEASURE IN DOING THINGS: NOT AT ALL
SUM OF ALL RESPONSES TO PHQ9 QUESTIONS 1 AND 2: 0
SUM OF ALL RESPONSES TO PHQ9 QUESTIONS 1 AND 2: 0
2. FEELING DOWN, DEPRESSED, IRRITABLE, OR HOPELESS: NOT AT ALL
SUM OF ALL RESPONSES TO PHQ9 QUESTIONS 1 AND 2: 0
2. FEELING DOWN, DEPRESSED, IRRITABLE, OR HOPELESS: NOT AT ALL
2. FEELING DOWN, DEPRESSED, IRRITABLE, OR HOPELESS: NOT AT ALL
1. LITTLE INTEREST OR PLEASURE IN DOING THINGS: NOT AT ALL

## 2018-01-01 ASSESSMENT — SOCIAL DETERMINANTS OF HEALTH (SDOH)
ACTIVE STRESSOR - HEALTH CHANGES: 1
ACTIVE STRESSOR - LOSS OF CONTROL: 1
ACTIVE STRESSOR - EXPRESSED EMOTIONAL NEED: 1
ACTIVE STRESSOR - EXPRESSED EMOTIONAL NEED: 1
ACTIVE STRESSOR - HEALTH CHANGES: 1
ACTIVE STRESSOR - EXHAUSTION: 1
ACTIVE STRESSOR - EXPRESSED EMOTIONAL NEED: 1
ACTIVE STRESSOR - HEALTH CHANGES: 1
ACTIVE STRESSOR - EXHAUSTION: 1
ACTIVE STRESSOR - EXHAUSTION: 1
ACTIVE STRESSOR - EXPRESSED EMOTIONAL NEED: 1
ACTIVE STRESSOR - EXPRESSED EMOTIONAL NEED: 1
ACTIVE STRESSOR - HEALTH CHANGES: 1
ACTIVE STRESSOR - NO STRESS FACTORS: 1

## 2018-01-01 ASSESSMENT — LIFESTYLE VARIABLES
ALCOHOL_USE: NO
EVER_SMOKED: NEVER

## 2018-01-01 ASSESSMENT — PAIN DESCRIPTION - DESCRIPTORS
DESCRIPTORS: MISERABLE
DESCRIPTORS: SHARP

## 2018-03-30 NOTE — PROGRESS NOTES
"Subjective:      Uriel Branham is a 67 y.o. male who presents with Weight Loss (in 14 days lost 15 lbs, was thinking was fighting the flu) and Ear Fullness (can not hear out of R ear)            Subjective: Patient is a 67-year-old male who comes with a 2 week history of what started as body aches, chills, fever, cough. He states over the last 2 weeks he lost 15 pounds because he was so weak. He states he feels 100 times better today but still has a productive cough with some green sputum. Denies current fever, chills, shortness of breath, weakness, dizziness. States he is here because he wanted to be evaluated. Denies night sweats or recent travel. Patient states that he thinks he had the flu.   Patient denies any known medical problems,  Denies pertinent surgical history  Denies pertinent family history  Allergies: No known drug allergies      Weight Loss   Associated symptoms include chills and coughing. Pertinent negatives include no chest pain, congestion, myalgias, nausea, rash or vomiting.   Ear Fullness   Associated symptoms include chills and coughing. Pertinent negatives include no chest pain, congestion, myalgias, nausea, rash or vomiting.       Review of Systems   Constitutional: Positive for chills and weight loss.   HENT: Negative for congestion.    Respiratory: Positive for cough and sputum production. Negative for shortness of breath and wheezing.    Cardiovascular: Negative for chest pain.   Gastrointestinal: Negative for heartburn, nausea and vomiting.   Genitourinary: Negative.    Musculoskeletal: Negative for myalgias.   Skin: Negative for itching and rash.   Neurological: Negative for dizziness.   Psychiatric/Behavioral: Negative for depression.          Objective:     /86   Pulse 72   Temp 37 °C (98.6 °F)   Resp 16   Ht 1.778 m (5' 10\")   Wt 65 kg (143 lb 3.2 oz)   SpO2 96%   BMI 20.55 kg/m²      Physical Exam       Gen.: Patient is A and O ×3, no acute distress, well-nourished " well-hydrated  Vitals: Are listed and unremarkable  HEENT: Heads normocephalic, atraumatic, PERRLA, extraocular movements intact, TMs and oropharynx clear  Neck: Soft supple without cervical lymphadenopathy  Cardiovascular: Regular rate and rhythm normal S1 and S2. No murmurs, rubs or gallops  Lungs are clear to auscultation bilaterally. no wheezes rales or rhonchi  Abdomen is soft, nontender, nondistended with good bowel sounds, no hepatosplenomegaly  Skin: Is well perfused without evidence of rash or lesions  Neurological:  cranial nerves II through XII were assessed and intact.  Musculoskeletal: Full range of motion, 5 out of 5 strength against resistance  Neurovascularly: Intact with a 2 second cap refill, good distal pulses    Urgent care course I ordered a checks x-ray and labs.     Assessment/Plan:     1. Influenza-like illness    - DX-CHEST-2 VIEWS; Future  - CBC WITH DIFFERENTIAL; Future  - BASIC METABOLIC PANEL; Future  - TSH; Future    2. Persistent cough      3. Lung field abnormal finding on examination      4. Weight loss      , I fully examined the patient and discussed my recommendations. I ordered a chest x-ray and labs. Patient left while I was in another exam room stating he did not have time to get anything done to the medical assistant

## 2018-05-02 NOTE — PROGRESS NOTES
Subjective:     Uriel Branham is a 67 y.o. male who presents for Cough  Patient presents to clinic today with complaints of a cough, chest tightness, shortness of breath. Patient states that in the past week he has not able to lie flat at night to sleep. Patient feels short of breath. Patient states prior to week ago he was able to run and play tennis without any problems. Patient states cough is productive at times. Patient denies any fevers, body aches, malaise. Patient has an appointment scheduled with his ENT specialist next week for evaluation of his sinuses.     Cough   This is a new problem. The current episode started 1 to 4 weeks ago. The problem has been gradually worsening. The problem occurs constantly. The cough is non-productive. Associated symptoms include shortness of breath and wheezing. Pertinent negatives include no chest pain, chills, fever, myalgias, nasal congestion, postnasal drip, rash or sore throat. Nothing aggravates the symptoms. Risk factors for lung disease include smoking/tobacco exposure. He has tried nothing for the symptoms. The treatment provided no relief. His past medical history is significant for pneumonia.   No past medical history on file.No past surgical history on file.  Social History     Social History   • Marital status: Single     Spouse name: N/A   • Number of children: N/A   • Years of education: N/A     Occupational History   • Not on file.     Social History Main Topics   • Smoking status: Never Smoker   • Smokeless tobacco: Never Used   • Alcohol use Not on file   • Drug use: Unknown   • Sexual activity: Not on file     Other Topics Concern   • Not on file     Social History Narrative   • No narrative on file    No family history on file. Review of Systems   Constitutional: Negative for chills and fever.   HENT: Negative for postnasal drip and sore throat.    Eyes: Negative for pain.   Respiratory: Positive for cough, sputum production, shortness of breath and  "wheezing.    Cardiovascular: Negative for chest pain.   Gastrointestinal: Negative for nausea and vomiting.   Genitourinary: Negative for hematuria.   Musculoskeletal: Negative for myalgias.   Skin: Negative for rash.   Neurological: Negative for dizziness.   No Known Allergies   Objective:   /70   Pulse 88   Temp 36.8 °C (98.2 °F)   Resp 20   Ht 1.778 m (5' 10\")   Wt 67.1 kg (148 lb)   SpO2 92%   BMI 21.24 kg/m²   Physical Exam   Constitutional: He is oriented to person, place, and time. He appears well-developed and well-nourished. No distress.   HENT:   Head: Normocephalic and atraumatic.   Right Ear: Tympanic membrane normal.   Left Ear: Tympanic membrane normal.   Nose: Nose normal. Right sinus exhibits no maxillary sinus tenderness and no frontal sinus tenderness. Left sinus exhibits no maxillary sinus tenderness and no frontal sinus tenderness.   Mouth/Throat: Uvula is midline, oropharynx is clear and moist and mucous membranes are normal. No posterior oropharyngeal edema, posterior oropharyngeal erythema or tonsillar abscesses. No tonsillar exudate.   Eyes: Conjunctivae and EOM are normal. Pupils are equal, round, and reactive to light. Right eye exhibits no discharge. Left eye exhibits no discharge.   Cardiovascular: Normal rate and regular rhythm.    No murmur heard.  Pulmonary/Chest: Effort normal. No respiratory distress. He has no decreased breath sounds. He has wheezes. He has no rhonchi. He has no rales.   Abdominal: Soft. He exhibits no distension. There is no tenderness.   Neurological: He is alert and oriented to person, place, and time. He has normal reflexes. No sensory deficit.   Skin: Skin is warm, dry and intact.   Psychiatric: He has a normal mood and affect.   Vitals reviewed.        Assessment/Plan:   Assessment    1. Cough  2. Wheezing-associated respiratory infection (WARI)  - ipratropium-albuterol (DUONEB) nebulizer solution; 3 mL by Nebulization route Once.  - DX-CHEST-2 " VIEWS; Future  - MethylPREDNISolone (MEDROL DOSEPAK) 4 MG Tablet Therapy Pack; Take 1 Tab by mouth every day.  Dispense: 1 Kit; Refill: 0  - albuterol 108 (90 Base) MCG/ACT Aero Soln inhalation aerosol; Inhale 2 Puffs by mouth every 6 hours as needed for Shortness of Breath.  Dispense: 1 Inhaler; Refill: 0    Xray results  No radiographic evidence of acute cardiopulmonary process    Lung sounds mildly improved with breathing treatment, wheezing still noted. PO2 reassessed  And 92%.   Patient without fevers, without productive cough. Do not suspect bacterial etiology. Will tx with steroids and inhaler.    Advised if symptoms don't improve or worsens must return to clinic for reevaluation.    Patient given precautionary s/sx that mandate immediate follow up and evaluation in the ED. Advised of risks of not doing so.    DDX, Supportive care, and indications for immediate follow-up discussed with patient.    Instructed to return to clinic or nearest emergency department if we are not available for any change in condition, further concerns, or worsening of symptoms.    The patient demonstrated a good understanding and agreed with the treatment plan.

## 2018-09-24 NOTE — PROGRESS NOTES
"Subjective:      Uriel Branham is a 68 y.o. male who presents with LLQ Pain (x 2 months, Left lower abdominal pain, needs something for pain  \"Pt. was seen on 09/17/18\")            Abdominal Pain   This is a new problem. Episode onset: 2 weeks. The problem occurs daily. The problem has been rapidly worsening. The pain is located in the generalized abdominal region and left flank. The quality of the pain is a sensation of fullness and aching. Associated symptoms include anorexia, belching, constipation, nausea and weight loss. Pertinent negatives include no diarrhea, dysuria, fever, flatus, frequency, hematochezia, hematuria, melena or vomiting. Nothing aggravates the pain. The pain is relieved by nothing. He has tried acetaminophen for the symptoms. The treatment provided no relief. Prior diagnostic workup includes CT scan and ultrasound.   Patient notes worsening of symptoms last 2 days; unable to see new oncologist for several more days.  PMH new pancreatic mass.    Review of Systems   Constitutional: Positive for diaphoresis and weight loss. Negative for fever.        Night sweats   Respiratory: Negative for shortness of breath.    Cardiovascular: Negative for chest pain.   Gastrointestinal: Positive for abdominal pain, anorexia, constipation and nausea. Negative for blood in stool, diarrhea, flatus, hematochezia, melena and vomiting.   Genitourinary: Negative for dysuria, frequency, hematuria and urgency.   Skin: Negative for rash.   Neurological: Negative for dizziness.     PMH:  has no past medical history on file.  MEDS:   Current Outpatient Prescriptions:   •  MethylPREDNISolone (MEDROL DOSEPAK) 4 MG Tablet Therapy Pack, Take 1 Tab by mouth every day., Disp: 1 Kit, Rfl: 0  •  albuterol 108 (90 Base) MCG/ACT Aero Soln inhalation aerosol, Inhale 2 Puffs by mouth every 6 hours as needed for Shortness of Breath., Disp: 1 Inhaler, Rfl: 0  ALLERGIES: No Known Allergies  SURGHX: No past surgical history on " "file.  SOCHX:  reports that he has quit smoking. He has never used smokeless tobacco.  FH: family history is not on file.       Objective:     /84 (BP Location: Left arm, Patient Position: Sitting, BP Cuff Size: Adult)   Pulse 96   Temp 36.7 °C (98 °F) (Temporal)   Resp 16   Ht 1.778 m (5' 10\")   Wt 63.6 kg (140 lb 3.2 oz)   SpO2 98%   BMI 20.12 kg/m²      Physical Exam   Constitutional: He is oriented to person, place, and time. Vital signs are normal. He appears well-developed and well-nourished. He is cooperative.  Non-toxic appearance. He does not have a sickly appearance. He does not appear ill. No distress.   HENT:   Mouth/Throat: Mucous membranes are not pale and dry.   Eyes: Conjunctivae and lids are normal. No scleral icterus.   Neck: Phonation normal. Neck supple.   Cardiovascular: Normal rate, regular rhythm and normal heart sounds.    Pulmonary/Chest: Effort normal and breath sounds normal.   Abdominal: Soft. He exhibits distension. He exhibits no mass. Bowel sounds are increased. There is generalized tenderness. There is guarding. There is no rigidity, no rebound and no CVA tenderness. No hernia.   Neurological: He is alert and oriented to person, place, and time.   Skin: Skin is warm and dry.   Psychiatric: He has a normal mood and affect.     Advised patient that due to escalating symptoms ED evaluation and management is recommended.  Patient agreed; notes that he has some things to take care of first.  Advised n.p.o. and ED evaluation as soon as possible.     [facility] EDP provided telephone report.     Assessment/Plan:     1. Generalized abdominal pain    2. Pancreatic mass        "

## 2018-09-25 NOTE — ED NOTES
ERP at bedside. Pt agrees with plan of care discussed by ERP. AIDET acknowledged with patient. Trey in low position, side rail up for pt safety. Call light within reach. Will continue to monitor.

## 2018-09-25 NOTE — ED NOTES
Discharge information reviewed in detail. Pt verbalized understanding of discharge instructions to follow up with Oncology, PCP and to return to ER if condition worsens. Pt educated/expressed awareness of not driving or operating heavy machinery.   Patient educated no combining of alcohol with other sedating medications. Patient reports that he has a safe ride home. Pt ambulated out of ER in a steady gait.

## 2018-09-25 NOTE — ED PROVIDER NOTES
ED Provider Note    CHIEF COMPLAINT  Chief Complaint   Patient presents with   • Abdominal Pain       HPI  Uriel Branham is a 68 y.o. male who presents complaining of epigastric abdominal pain that radiates around to his left back and left iliac crest area.  He states that he has had this pain for the last 6 months and on the 18th of this month had a CT of the abdomen which showed a pancreatic mass in the tail the pancreas.  He recently had an ultrasound that showed the same thing.  He currently is set up for an oncology appointment on October 3 but no one is prescribe him any pain medication and he has not had any pain relief.  Last night he tried some medical marijuana but still did not feel better.  He states he has been constipated for the last 2 days.  He denies any vomiting or fevers.  He states the pain is constant and unrelenting.  Patient has a history of smoking but does not currently smoke.  He denies any alcohol or other drug use except for the marijuana he tried last night.  He has reported weight loss and decreased appetite.    REVIEW OF SYSTEMS  HEENT:  No ear pain, congestion or sore throat   EYES: no discharge redness or vision changes  CARDIAC: no chest pain, palpitations    PULMONARY: no dyspnea, cough or congestion   GI: See history of present illness  : no dysuria, back pain or hematuria   Neuro: no weakness, numbness aphasia or headache  Musculoskeletal: no swelling deformity or pain no joint swelling  Endocrine: no fevers, sweating, weight loss   SKIN: no rash, erythema or contusions     See history of present illness all other systems are negative      PAST MEDICAL HISTORY  No past medical history on file.    FAMILY HISTORY  History reviewed. No pertinent family history.    SOCIAL HISTORY  Social History     Social History   • Marital status: Single     Spouse name: N/A   • Number of children: N/A   • Years of education: N/A     Social History Main Topics   • Smoking status: Former Smoker  "  • Smokeless tobacco: Never Used   • Alcohol use Not on file   • Drug use: Unknown   • Sexual activity: Not on file     Other Topics Concern   • Not on file     Social History Narrative   • No narrative on file       SURGICAL HISTORY  No past surgical history on file.    CURRENT MEDICATIONS  Home Medications     Reviewed by Randy Lowry (Pharmacy Tech) on 09/25/18 at 0751  Med List Status: Complete   Medication Last Dose Status   B Complex Vitamins (B COMPLEX PO) > 5 days Active   Cholecalciferol (VITAMIN D PO) > 5 days Active   ibuprofen (MOTRIN) 200 MG Tab < 2 days Active   Omega-3 Fatty Acids (FISH OIL) 1000 MG Cap capsule > 5 days Active                 ALLERGIES  No Known Allergies    PHYSICAL EXAM  VITAL SIGNS: /69   Pulse 79   Temp 36.4 °C (97.5 °F)   Resp 18   Ht 1.778 m (5' 10\")   Wt 64.3 kg (141 lb 12.1 oz)   SpO2 99%   BMI 20.34 kg/m²  Room air O2: 97    Constitutional: Well developed, Well nourished, No acute distress, Non-toxic appearance.   HENT: Normocephalic, Atraumatic, Bilateral external ears normal, Oropharynx moist, No oral exudates, Nose normal.   Eyes: PERRLA, EOMI, Conjunctiva normal, No discharge.   Neck: Normal range of motion, No tenderness, Supple, No stridor.   Lymphatic: No lymphadenopathy noted.   Cardiovascular: Normal heart rate, Normal rhythm, No murmurs, No rubs, No gallops.   Thorax & Lungs: Normal breath sounds, No respiratory distress, No wheezing, No chest tenderness.   Abdomen: Soft nondistended normal bowel sounds no rebound masses or peritoneal signs  Skin: Warm, Dry, No erythema, No rash.   Back: No tenderness, No CVA tenderness.   Extremities: Intact distal pulses, No edema, No tenderness, No cyanosis, No clubbing.   Neurologic: Alert & oriented x 3, Normal motor function, Normal sensory function, No focal deficits noted.       RADIOLOGY/PROCEDURES  [id number] VIEW   Final Result         No specific finding to suggest small bowel obstruction.    "         COURSE & MEDICAL DECISION MAKING  Pertinent Labs & Imaging studies reviewed. (See chart for details)  Differential diagnosis: Obstruction, ileus, constipation, pain from the pancreatic mass    Results for orders placed or performed during the hospital encounter of 09/25/18   CBC WITH DIFFERENTIAL   Result Value Ref Range    WBC 13.0 (H) 4.8 - 10.8 K/uL    RBC 4.64 (L) 4.70 - 6.10 M/uL    Hemoglobin 14.7 14.0 - 18.0 g/dL    Hematocrit 43.2 42.0 - 52.0 %    MCV 93.1 81.4 - 97.8 fL    MCH 31.7 27.0 - 33.0 pg    MCHC 34.0 33.7 - 35.3 g/dL    RDW 43.6 35.9 - 50.0 fL    Platelet Count 260 164 - 446 K/uL    MPV 8.8 (L) 9.0 - 12.9 fL    Neutrophils-Polys 71.60 44.00 - 72.00 %    Lymphocytes 12.90 (L) 22.00 - 41.00 %    Monocytes 11.10 0.00 - 13.40 %    Eosinophils 3.40 0.00 - 6.90 %    Basophils 0.50 0.00 - 1.80 %    Immature Granulocytes 0.50 0.00 - 0.90 %    Nucleated RBC 0.00 /100 WBC    Neutrophils (Absolute) 9.34 (H) 1.82 - 7.42 K/uL    Lymphs (Absolute) 1.68 1.00 - 4.80 K/uL    Monos (Absolute) 1.45 (H) 0.00 - 0.85 K/uL    Eos (Absolute) 0.44 0.00 - 0.51 K/uL    Baso (Absolute) 0.06 0.00 - 0.12 K/uL    Immature Granulocytes (abs) 0.06 0.00 - 0.11 K/uL    NRBC (Absolute) 0.00 K/uL   COMP METABOLIC PANEL   Result Value Ref Range    Sodium 129 (L) 135 - 145 mmol/L    Potassium 3.9 3.6 - 5.5 mmol/L    Chloride 93 (L) 96 - 112 mmol/L    Co2 28 20 - 33 mmol/L    Anion Gap 8.0 0.0 - 11.9    Glucose 111 (H) 65 - 99 mg/dL    Bun 10 8 - 22 mg/dL    Creatinine 1.02 0.50 - 1.40 mg/dL    Calcium 9.1 8.4 - 10.2 mg/dL    AST(SGOT) 13 12 - 45 U/L    ALT(SGPT) 7 2 - 50 U/L    Alkaline Phosphatase 45 30 - 99 U/L    Total Bilirubin 0.5 0.1 - 1.5 mg/dL    Albumin 3.9 3.2 - 4.9 g/dL    Total Protein 6.7 6.0 - 8.2 g/dL    Globulin 2.8 1.9 - 3.5 g/dL    A-G Ratio 1.4 g/dL   LIPASE   Result Value Ref Range    Lipase 19 7 - 58 U/L   ESTIMATED GFR   Result Value Ref Range    GFR If African American >60 >60 mL/min/1.73 m 2    GFR If Non  African American >60 >60 mL/min/1.73 m 2   POC UA   Result Value Ref Range    POC Color Yellow     POC Appearance Clear     POC Glucose Negative Negative mg/dL    POC Ketones Negative Negative mg/dL    POC Specific Gravity 1.010 1.005 - 1.030    POC Blood Negative Negative    POC Urine PH 6.0 5.0 - 8.0    POC Protein Negative Negative mg/dL    POC Nitrites Negative Negative    POC Leukocyte Esterase Negative Negative        8:28 AM the patient's sodium was found to be low so I gave him a liter of IV normal saline.  I spoke with ED scheduling and they gave him an appointment with a primary care physician Dr. Ramsey at 3:40 PM tomorrow September 26 to establish care and for pain management for this problem.  He is scheduled to see Dr. Callaway oncology on October 3.      10:23 AM Fluid response was positive and the patient feels improved.  I did not initially give an oral fluid challenge because of his increased pain and decreased p.o. intake at home causing his sodium to be low.  I will discharge him home with stool softeners and pain medication.  He is to follow-up with his primary care doctor to establish care tomorrow and the oncologist on Thursday of this week instead of October 3.  The patient understands his diagnosis and his follow-up appointments as they have been scheduled.  He will be discharged home in stable condition is return for any worsening symptoms.    In prescribing controlled substances to this patient, I certify that I have obtained and reviewed the medical history of Uriel Branham. I have also made a good rodger effort to obtain applicable records from other providers who have treated the patient and records did not demonstrate any increased risk of substance abuse that would prevent me from prescribing controlled substances.     I have conducted a physical exam and documented it. I have reviewed Mr. Branham’s prescription history as maintained by the [place] Prescription Monitoring Program.     I  have assessed the patient’s risk for abuse, dependency, and addiction using the validated Opioid Risk Tool available at https://www.mdcalc.com/gishtw-gipr-rqmf-ort-narcotic-abuse.     Given the above, I believe the benefits of controlled substance therapy outweigh the risks. The reasons for prescribing controlled substances include non-narcotic, oral analgesic alternatives have been inadequate for pain control. Accordingly, I have discussed the risk and benefits, treatment plan, and alternative therapies with the patient.       SANDRA Thornton  [address]  [facility] [address]  [phone]      9/27 a 10:30am, to establish care    Lucinda Reyna M.D.  [address]  [address]  [facility] [address]  [phone]    In 1 day  9/26 at 3:40pm, to establish care    Current Outpatient Prescriptions   Medication Sig Dispense Refill   • Cholecalciferol (VITAMIN D PO) Take 1 Cap by mouth every day.     • Omega-3 Fatty Acids (FISH OIL) 1000 MG Cap capsule Take 1,000 mg by mouth every day.     • B Complex Vitamins (B COMPLEX PO) Take 1 Tab by mouth every day.     • ibuprofen (MOTRIN) 200 MG Tab Take 400 mg by mouth every 6 hours as needed for Mild Pain.     • docusate sodium (COLACE) 100 MG Cap Take 1 Cap by mouth 2 times a day. 60 Cap 0   • HYDROcodone-acetaminophen (NORCO) 5-325 MG Tab per tablet Take 1-2 Tabs by mouth every 6 hours as needed for up to 3 days. 10 Tab 0           FINAL IMPRESSION  1. Pancreatic mass Active   2. Epigastric pain Active   3. Constipation, unspecified constipation type Active   4. Hyponatremia Active           Electronically signed by: Catia Nelson, 9/25/2018 7:44 AM

## 2018-09-25 NOTE — ED NOTES
Med rec updated and complete  Allergies reviewed  Interviewed pt with girlfriend at bedside with permission from pt.  Pt reports no prescription medications.  Pt reports no antibiotics in the last 30 days.

## 2018-09-26 PROBLEM — K86.89 PANCREATIC MASS: Status: ACTIVE | Noted: 2018-01-01

## 2018-09-26 NOTE — PROGRESS NOTES
CC: New patient ( pancreatic mass, mild abdominal pain)    HPI:  Uriel presents today to establish a new PCP.    Patient has been healthy all his life, no chronic medical issues. He works as a chiropractor. Has been active and independent with all ADLs all his life. He experienced abdominal pain started 2.5 month ago, gets worse in the past month.  it is an upper abdominal pain with intermittent radiation to the left side of his back and flank .  he was concerned about possible kidney stone and or gallbladder , but a recent CT abdomen was done at the ER showed:  Exam was performed without IV contrast. There appears to be an expansile lesion within the pancreatic tail. Differential diagnosis includes pancreatic pseudocyst abscess or pancreatic carcinoma. Ultrasound may be helpful for further evaluation. So an Ultrasound was done and it showed: Mass in pancreatic tail measures 5.5 x 3.1 x 4.0 cm. The lesion is not well visualized due to overlying bowel gas. Vascularity of the lesion cannot be evaluated. Differential diagnosis does include pancreatic carcinoma.    Patient was referred to oncology intake and he has appt tomorrow, He denies nausea vomiting, fever dysuria, change in the color of his skin, urine or stool. Has been feeling tired, with some loss of appetite, but his weight chart does not show significant change in his weight. Denies severe abdominal pain, he has been on Norco , he takes only one tablet since yesterday.    Will discuss HM topic next visit.        Patient Active Problem List    Diagnosis Date Noted   • Pancreatic mass 09/26/2018       Current Outpatient Prescriptions   Medication Sig Dispense Refill   • docusate sodium (COLACE) 100 MG Cap Take 1 Cap by mouth 2 times a day. 60 Cap 0   • HYDROcodone-acetaminophen (NORCO) 5-325 MG Tab per tablet Take 1-2 Tabs by mouth every 6 hours as needed for up to 3 days. 10 Tab 0   • docusate sodium (COLACE) 100 MG Cap Take 1 Cap by mouth 2 times a day. 60  "Cap 0   • Cholecalciferol (VITAMIN D PO) Take 1 Cap by mouth every day.     • Omega-3 Fatty Acids (FISH OIL) 1000 MG Cap capsule Take 1,000 mg by mouth every day.     • B Complex Vitamins (B COMPLEX PO) Take 1 Tab by mouth every day.     • ibuprofen (MOTRIN) 200 MG Tab Take 400 mg by mouth every 6 hours as needed for Mild Pain.       No current facility-administered medications for this visit.          Allergies as of 09/26/2018   • (No Known Allergies)        Social History     Social History   • Marital status: Single     Spouse name: N/A   • Number of children: N/A   • Years of education: N/A     Occupational History   • Not on file.     Social History Main Topics   • Smoking status: Former Smoker   • Smokeless tobacco: Never Used   • Alcohol use No   • Drug use: No   • Sexual activity: Not on file     Other Topics Concern   • Not on file     Social History Narrative   • No narrative on file       History reviewed. No pertinent family history.    History reviewed. No pertinent surgical history.    ROS:  Denies any Headache, Blurred Vision, Confusion Chest pain,  Shortness of breath,  Abdominal pain, Changes of bowel or bladder, Lower ext edema, Fevers, Nights sweats, Weight Changes, Focal weakness or numbness.  All other systems are negative.    /70 (BP Location: Right arm, Patient Position: Sitting, BP Cuff Size: Adult)   Pulse 87   Temp 36.8 °C (98.2 °F)   Resp 16   Ht 1.778 m (5' 10\")   Wt 64.9 kg (143 lb)   SpO2 96%   BMI 20.52 kg/m²     Physical Exam:  Gen:         Alert and oriented, No apparent distress.  HEENT:   [place], TM clear,  Oralpharynx no erythema or exudates.  Neck:       No Jugular venous distension, Lymphadenopathy, Thyromegaly, Bruits.  Lungs:     Clear to auscultation bilaterally  CV:          Regular rate and rhythm. No murmurs, rubs or gallops.  Abd:         Soft non tender, non distended. Normal active bowel sounds. No                                        Hepatosplenomegaly, " No pulsatile masses.  Ext:          No clubbing, cyanosis, edema.  Abd:       Soft, mild tenderness on the mid abdomen, no distention or palpable mass , BS+      Assessment and Plan.   68 y.o. male     1. Pancreatic mass  R/O pancreatic cancer.  CT abdomen showed:  Exam was performed without IV contrast. There appears to be an expansile lesion within the pancreatic tail. Differential diagnosis includes pancreatic pseudocyst abscess or pancreatic carcinoma. Ultrasound may be helpful for further evaluation.   Contrast enhanced CT or MRI may also be helpful for further evaluation.    However abdominal US showed:  1.  Mass in pancreatic tail measures 5.5 x 3.1 x 4.0 cm. The lesion is not well visualized due to overlying bowel gas. Vascularity of the lesion cannot be evaluated. Differential diagnosis does include pancreatic carcinoma.    Has appointment with oncology tomorrow.    Continue pain control.    Will check CA 19-9    - CA 19-9; Future    2. Hyponatremia  Mild.Asymptomatic.   Will continue watch  ? Tumour related hyponatremia can not be excluded.    3. Healthcare maintenance  Will discuss HM topic next visit.

## 2018-09-27 NOTE — PROGRESS NOTES
"Subjective:      Uriel Branham is a 68 y.o. male who presents as a New Patient (IC Pancreatic Mass).        HPI    Patient referred to me, Intake Oncology Coordinator by  provider Juan Manuel Reyes PA-C for pancreas mass.  Patient is unaccompanied for today's visit.    Patient was recently seen in urgent care for increased significant abdominal pain.  Patient stated the last couple of months he has been noticing some symptoms that have been quite mild but over the last few weeks the symptoms have worsened.  He went into the urgent care for severe abdominal pain.  He initially thought it was related to a kidney stone.  He experienced hyperactive bowel sounds, gastric distress, bloating, and described his pain as episodic in waves.  He stated the pain would be so horrible that would last up to an hour or so.  He denied any vomiting but states he has some mild nausea.  He also experienced some constipation as well but has started taking a stool softener.  He did have a bowel movement this morning which was small but normal color.  He has also had some weight loss.  His normal weight is a proximally 150 pounds, and today's weight is 141.  He stated his weight loss has been over the last few months.  He does state that his pain can change at any time and will be \"all over the place.\"  He does state the main portion of his pain is in the left upper quadrant area that radiates around the back area and stated his pain is worse at night.  After his urgent care visit patient was sent for a CT scan of the abdomen and pelvis.    CT of the abdomen and pelvis completed on September 18, 2018 showed there to be an expansile lesion within the pancreatic tail.  According to the reading radiologist differentials include pancreatic pseudocyst abscess or pancreatic carcinoma.  Recommendation to proceed with an ultrasound for further evaluation.  There is also noted to be a small amount of ascites in the abdomen and pelvis, possible " varices and mesenteric vascular congestion.  He had an ultrasound completed the following day which showed a pancreatic tail mass measuring 5.5 x 3.1 x 4 cm in size.  Patient was subsequently referred to me for further evaluation.    Prior to consultation appointment today patient experienced significant pain and was seen in the emergency department.  At that time he was given Norco tablets for the pain.  He states he is taking approximately 1-2 tabs a day.  He does state that the pain is fairly managed with current Norco dose.    Please see past medical and surgical history below.    Patient denies a family history of cancer.    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  He stated that he smoked very minimally and not very long when he was in college.    No Known Allergies  Current Outpatient Prescriptions on File Prior to Visit   Medication Sig Dispense Refill   • Cholecalciferol (VITAMIN D PO) Take 1 Cap by mouth every day.     • Omega-3 Fatty Acids (FISH OIL) 1000 MG Cap capsule Take 1,000 mg by mouth every day.     • B Complex Vitamins (B COMPLEX PO) Take 1 Tab by mouth every day.     • ibuprofen (MOTRIN) 200 MG Tab Take 400 mg by mouth every 6 hours as needed for Mild Pain.     • docusate sodium (COLACE) 100 MG Cap Take 1 Cap by mouth 2 times a day. 60 Cap 0   • docusate sodium (COLACE) 100 MG Cap Take 1 Cap by mouth 2 times a day. 60 Cap 0     No current facility-administered medications on file prior to visit.      History reviewed. No pertinent past medical history.  Past Surgical History:   Procedure Laterality Date   • TONSILLECTOMY AND ADENOIDECTOMY      age 6     Family History   Problem Relation Age of Onset   • Cancer Mother         Liver      Social History     Social History   • Marital status: Single     Spouse name: N/A   • Number of children: N/A   • Years of education: N/A     Social History Main Topics   • Smoking status: Former Smoker   • Smokeless tobacco: Never Used      Comment: Young age - no  "long and not much   • Alcohol use No      Comment: social on occasiona   • Drug use: Yes     Types: Marijuana      Comment: Tried edibles a few days ago   • Sexual activity: Not on file     Other Topics Concern   • Not on file     Social History Narrative   • No narrative on file       Review of Systems   Constitutional: Positive for malaise/fatigue (good days and bad days) and weight loss. Negative for chills and fever.   HENT: Positive for congestion (nasal congestion is chronic). Negative for sore throat.    Respiratory: Negative for cough, shortness of breath and wheezing.    Cardiovascular: Negative for chest pain and palpitations.   Gastrointestinal: Positive for abdominal pain, constipation and nausea. Negative for diarrhea and vomiting.   Genitourinary: Negative for dysuria.        Dehydration sometimes   Musculoskeletal: Positive for back pain (earlier but none at this time).   Skin: Negative for itching and rash.   Neurological: Negative for dizziness, tingling and headaches.        Light headed a few times   Psychiatric/Behavioral: The patient has insomnia (pain is worse at night).           Objective:     /64 (BP Location: Left arm, Patient Position: Sitting, BP Cuff Size: Adult)   Pulse 85   Temp 37.1 °C (98.8 °F) (Temporal)   Resp 16   Ht 1.778 m (5' 10\")   Wt 64.3 kg (141 lb 12.1 oz)   SpO2 94%   BMI 20.34 kg/m²      Physical Exam   Constitutional: He is oriented to person, place, and time. He appears well-developed and well-nourished. No distress.   HENT:   Head: Normocephalic and atraumatic.   Mouth/Throat: Oropharynx is clear and moist. No oropharyngeal exudate.   Eyes: Pupils are equal, round, and reactive to light. Conjunctivae and EOM are normal. Right eye exhibits no discharge. Left eye exhibits no discharge. No scleral icterus.   Neck: Normal range of motion. Neck supple. No thyromegaly present.   Cardiovascular: Normal rate, regular rhythm, normal heart sounds and intact distal " pulses.  Exam reveals no gallop and no friction rub.    No murmur heard.  Pulmonary/Chest: Effort normal and breath sounds normal.   Abdominal: Soft. Bowel sounds are normal. He exhibits no distension. There is tenderness (noted more so on the left upper quadrant).   Musculoskeletal: Normal range of motion. He exhibits no edema or tenderness.   Lymphadenopathy:        Head (right side): No submental, no submandibular, no tonsillar, no preauricular, no posterior auricular and no occipital adenopathy present.        Head (left side): No submental, no submandibular, no tonsillar, no preauricular, no posterior auricular and no occipital adenopathy present.     He has no cervical adenopathy.        Right: No supraclavicular adenopathy present.        Left: No supraclavicular adenopathy present.   Neurological: He is alert and oriented to person, place, and time.   Skin: Skin is warm and dry. No rash noted. He is not diaphoretic. No erythema. No pallor.   Psychiatric: He has a normal mood and affect. His behavior is normal.   Vitals reviewed.         Ct-abdomen-pelvis W/o    Result Date: 9/18/2018 9/18/2018 11:01 AM HISTORY/REASON FOR EXAM:  Left flank pain. TECHNIQUE/EXAM DESCRIPTION: CT scan of the abdomen and pelvis without contrast. Noncontrast helical scanning was obtained from the diaphragmatic domes through the pubic symphysis. Low dose optimization technique was utilized for this CT exam including automated exposure control and adjustment of the mA and/or kV according to patient size. COMPARISON: None. FINDINGS: CT Abdomen: Space-occupying lesion with slightly decreased attenuation compared to surrounding parenchyma is noted which occupies and expands the pancreatic tail measuring approximately 5.0 x 2.4 cm in size. Small amount of free peritoneal fluid is identified. Findings suggest possible varices and mesenteric vascular congestion. No other solid organ abnormalities are appreciated on this noncontrast CT. No  adenopathy is appreciated. No abnormally dilated loops of bowel are identified. No urinary tract calculi or hydronephrosis is appreciated. CT Pelvis: Small amount of free fluid is noted in the pelvis. No adenopathy is noted. No focal peritoneal inflammation is identified.     1.  Exam was performed without IV contrast. There appears to be an expansile lesion within the pancreatic tail. Differential diagnosis includes pancreatic pseudocyst abscess or pancreatic carcinoma. Ultrasound may be helpful for further evaluation. Contrast enhanced CT or MRI may also be helpful for further evaluation. 2.  Possible varices and mesenteric vascular congestion. 3.  Small amount of ascites is noted in the abdomen and pelvis. 4.  No dilated loops of bowel are identified.    [id number] View    Result Date: 9/25/2018 9/25/2018 7:46 AM HISTORY/REASON FOR EXAM:  Gastrointestinal Complaint Left sided Abdominal pain that radiates posteriorly to back x 2 months that has increasing worsened in the last week TECHNIQUE/EXAM DESCRIPTION AND NUMBER OF VIEWS:  1 view(s) of the abdomen. COMPARISON: None FINDINGS: Moderate amount of stool throughout the colon. Nonspecific nonobstructive bowel gas pattern. No dilated gas-filled loop of small bowel. No portal venous gas or pneumatosis. No definite free intraperitoneal air but evaluation is limited on supine radiograph.     No specific finding to suggest small bowel obstruction.    Us-abdomen Limited    Result Date: 9/19/2018 9/19/2018 10:14 AM HISTORY/REASON FOR EXAM:  Lesion seen in pancreatic tail on CT TECHNIQUE/EXAM DESCRIPTION: Limited abdominal ultrasound. COMPARISON: None available. FINDINGS: Hypoechoic mass in pancreatic tail measures 5.5 x 3.1 x 4.0 cm. Pancreatic duct is borderline prominent at 3.3 mm. No other abnormalities are identified.     1.  Mass in pancreatic tail measures 5.5 x 3.1 x 4.0 cm. The lesion is not well visualized due to overlying bowel gas. Vascularity of the  lesion cannot be evaluated. Differential diagnosis does include pancreatic carcinoma.       Assessment/Plan:     1. Pancreatic mass  REFERRAL TO GASTROENTEROLOGY     Plan  1.  At this time patient does have a large 5.5 x 3.1 x 4 cm mass on the pancreatic tail concerning for malignancy.  I will refer patient to GI for an EUS biopsy.  I personally discussed the case in detail with Dr. Soto who is agreed to see the patient.  Patient verbalized understanding and is in agreement the plan for the referral.    2.  Patient does have significant pain.  He has been taking approximately 1-2 tablets of Norco per day.  He was given 10 tablets of [place] from the ER physician and is concerned that he may run out of prescription soon.  I personally spoke with patient's primary care provider who is agreed to issue another prescription to him.    3.  I will await results of biopsy to determine further plan of care based on findings.      Spent 60 minutes in direct, face-to-face patient contact in which greater than 50% of the visit was spent counseling and coordinating of care.       Please note that this dictation was created using voice recognition software. I have made every reasonable attempt to correct obvious errors, but I expect that there are errors of grammar and possibly content that I did not discover before finalizing the note.

## 2018-09-28 NOTE — TELEPHONE ENCOUNTER
----- Message from Josesito Paul, Med [facility] sent at 9/27/2018  3:24 PM PDT -----  Call Radha at [phone] to confirm referral and appointment

## 2018-09-28 NOTE — TELEPHONE ENCOUNTER
I left a message for [facility] at [facility] to find out when patient will be scheduled to see Dr. Soto as he was not scheduled to see him today.

## 2018-09-28 NOTE — TELEPHONE ENCOUNTER
The appointment has been reserved for October 8th with Dr. Soto as soon as they receive the referral.     Please fax to: [phone]

## 2018-10-01 NOTE — TELEPHONE ENCOUNTER
I spoke to Audelia at [facility] regarding patients referral and why he isn't being scheduled until 10/8/18. Audelia stated that Dr. Soto is out of office all this week. Dr. Soto requested for patient to be scheduled on 10/8/18, per Audelia. Per Audelia Soto's schedule was booked on Friday 9/26/18.     Patient will see Dr. Soto on 10/8/18 at 11:45 am for new patient and procedure possibly on 10/16/18.

## 2018-10-02 NOTE — TELEPHONE ENCOUNTER
Patients friend called regarding pain and would like refill, I informed she or patient would have to follow up with prescribing provider Dr. Reyna. Last prescribed 9/27/2018 for 30 tablets. Friend agreed and will call Dr. Reyna's office.

## 2018-10-06 PROBLEM — E87.6 HYPOKALEMIA: Status: ACTIVE | Noted: 2018-01-01

## 2018-10-06 PROBLEM — R10.9 AP (ABDOMINAL PAIN): Status: ACTIVE | Noted: 2018-01-01

## 2018-10-06 PROBLEM — D72.829 LEUKOCYTOSIS: Status: ACTIVE | Noted: 2018-01-01

## 2018-10-06 PROBLEM — K86.89 PANCREATIC MASS: Status: RESOLVED | Noted: 2018-01-01 | Resolved: 2018-01-01

## 2018-10-06 PROBLEM — E87.1 HYPONATREMIA: Status: ACTIVE | Noted: 2018-01-01

## 2018-10-06 NOTE — H&P
Hospital Medicine History and Physical    Date of Service  10/6/2018    Chief Complaint  Chief Complaint   Patient presents with   • Abdominal Pain       History of Presenting Illness  68 y.o. male with No PMHx,  presented 10/6/2018 to the ER for evaluation of abdominal pain which started about 2 weeks ago and has been progressively worsening.  Patient describes the pain as 7 out of 10 in severity and sometimes shoots up to 10 out of 10 in severity, stabbing, dull pain.  No exacerbating or relieving factors.  Patient was recently seen in urgent care and underwent a CT scan of his abdomen pelvis which revealed a pancreatic tail mass.  On today's admission repeat CT does show increase in size of this mass.  At this point patient will be admitted for IV pain control and further GI recommendations.                  Primary Care Physician  Lucinda Reyna M.D.    [facility]    Code Status  Code: Full code    Review of Systems  Review of Systems   Constitutional: Positive for malaise/fatigue. Negative for chills and fever.   HENT: Negative for congestion, hearing loss, sore throat and tinnitus.    Eyes: Negative for blurred vision, double vision, photophobia and pain.   Respiratory: Negative for cough, hemoptysis, sputum production, shortness of breath and stridor.    Cardiovascular: Negative for chest pain, palpitations, orthopnea, claudication and PND.   Gastrointestinal: Positive for abdominal pain. Negative for blood in stool, constipation, heartburn, melena, nausea and vomiting.   Genitourinary: Negative for dysuria, frequency and urgency.   Musculoskeletal: Negative for back pain, myalgias and neck pain.   Neurological: Negative for dizziness, tingling, tremors, sensory change, speech change, weakness and headaches.   Psychiatric/Behavioral: Negative for depression, memory loss and suicidal ideas. The patient is not nervous/anxious.           Past Medical History  No past medical history per  patient.    Surgical History  Past Surgical History:   Procedure Laterality Date   • TONSILLECTOMY AND ADENOIDECTOMY      age 6       Medications  No current facility-administered medications on file prior to encounter.      Current Outpatient Prescriptions on File Prior to Encounter   Medication Sig Dispense Refill   • HYDROcodone-acetaminophen (NORCO) 5-325 MG Tab per tablet Take 1 Tab by mouth every 12 hours as needed for up to 15 days. 30 Tab 0   • docusate sodium (COLACE) 100 MG Cap Take 1 Cap by mouth 2 times a day. 60 Cap 0       Family History  Family History   Problem Relation Age of Onset   • Cancer Mother         Liver        Social History  Social History   Substance Use Topics   • Smoking status: Former Smoker   • Smokeless tobacco: Never Used      Comment: Young age - no long and not much   • Alcohol use No      Comment: social on occasiona       Allergies  No Known Allergies     Physical Exam  Laboratory   Hemodynamics  Temp (24hrs), Av.9 °C (98.5 °F), Min:36.9 °C (98.5 °F), Max:36.9 °C (98.5 °F)   Temperature: 36.9 °C (98.5 °F)  Pulse  Av.3  Min: 78  Max: 104 Heart Rate (Monitored): 100  Blood Pressure : 145/87, NIBP: (!) 189/102      Respiratory      Respiration: (!) 22, Pulse Oximetry: 99 %             Physical Exam   Constitutional: He is oriented to person, place, and time. He appears well-developed and well-nourished. No distress.   HENT:   Head: Normocephalic and atraumatic.   Mouth/Throat: No oropharyngeal exudate.   Eyes: Pupils are equal, round, and reactive to light. Conjunctivae are normal. Right eye exhibits no discharge. No scleral icterus.   Neck: Neck supple. No JVD present. No thyromegaly present.   Cardiovascular: Normal rate and intact distal pulses.    No murmur heard.  Pulses:       Dorsalis pedis pulses are 2+ on the right side, and 2+ on the left side.   Cap refill < 3 s   Pulmonary/Chest: Effort normal and breath sounds normal. No stridor. No respiratory distress. He  has no wheezes. He has no rales.   Abdominal: Soft. Bowel sounds are normal. He exhibits no distension and no mass. There is tenderness (generalized abdominal, ). There is no rebound and no guarding.   Musculoskeletal: Normal range of motion. He exhibits no edema.   Neurological: He is alert and oriented to person, place, and time. No cranial nerve deficit.   Skin: Skin is warm and dry. He is not diaphoretic. No erythema.   Psychiatric: He has a normal mood and affect. His behavior is normal. Thought content normal.   Vitals reviewed.        Assessment/Plan  Pancreatic mass- (present on admission)   Assessment & Plan    Newly diagnosed with pancreatic mass  GI consulted for possibility of EUS and biopsy, versus surgical oncology  Waiting for recommendations  Pain control at this time        Hyponatremia   Assessment & Plan    Secondary to poor p.o. 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  MS contin 15mg BID started with PO oxycodone for breakthrough, will utilize IV pain control as needed.              I anticipate this patient will require at least two midnights for appropriate medical management, necessitating inpatient admission.    Prophylaxis: lovenox    Recent Labs      10/06/18   1120   WBC  19.1*   RBC  4.84   HEMOGLOBIN  15.4   HEMATOCRIT  42.6   MCV  88.0   MCH  31.8   MCHC  36.2*   RDW  40.1   PLATELETCT  477*   MPV  8.5*     Recent Labs      10/06/18   1120   SODIUM  128*   POTASSIUM  3.5*   CHLORIDE  89*   CO2  22   GLUCOSE  122*   BUN  12   CREATININE  0.99   CALCIUM  9.7      Recent Labs      10/06/18   1120   ALTSGPT  10   ASTSGOT  22   ALKPHOSPHAT  52   TBILIRUBIN  1.4   LIPASE  21   GLUCOSE  122*                 No results found for: TROPONINI    Imaging  CT-ABDOMEN-PELVIS WITH   Final Result      3.5 x 4.2 x 4.3 cm mass in the tail of pancreas highly suspicious for adenocarcinoma the pancreas.      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      Slight interval increase in small amount of perihepatic, perisplenic and pelvic ascites.      Diverticulosis without evidence of diverticulitis.

## 2018-10-06 NOTE — ASSESSMENT & PLAN NOTE
2/2 pancreatic cancer    Slowly improving, titration of  his PCA down   Continue prn oxycodone.   Reglan added for nausea/vomoiting  Prn haldol also for nausea added

## 2018-10-06 NOTE — ED NOTES
Med rec complete per pt and family at bedside  Per pt, ok to discuss medication information with family present  Allergies reviewed - NKDA  No ABX in last month  Family has pt's [place] bottle with them. Reviewed and returned to them

## 2018-10-06 NOTE — ED PROVIDER NOTES
ED Provider Note    CHIEF COMPLAINT  Chief Complaint   Patient presents with   • Abdominal Pain       HPI  Uriel Branham is a 68 y.o. male who presents to the emergency department complaint of abdominal pain.  The patient been having abdominal pain off and on for some time and at least a couple of months.  Initially is on the left side thought it might be a kidney stone.  And it never really went away or behave the way it was further previous kidney stones.  The pain persisted.  He ultimately got worked up as an outpatient and was found to have a mass.  Since that time is been in the emergency department he has been scheduled to see GI as an outpatient but he has pain that is worsening and difficult to control.  The pain is on the left side and then generalizes across the top.  It is severe.  It is preventing him from eating, drinking, and sleeping.  He has had associated weight loss at least 10 pounds in the last 2 weeks.  Denies any fevers or chills.  No hematemesis melena hematochezia.  Denies any other aggravating leaving factors or associated complaints.    REVIEW OF SYSTEMS  See HPI for further details. All other systems are negative.    PAST MEDICAL HISTORY  History reviewed. No pertinent past medical history.    FAMILY HISTORY  Family History   Problem Relation Age of Onset   • Cancer Mother         Liver        SOCIAL HISTORY  Social History     Social History   • Marital status: Single     Spouse name: N/A   • Number of children: N/A   • Years of education: N/A     Social History Main Topics   • Smoking status: Former Smoker   • Smokeless tobacco: Never Used      Comment: Young age - no long and not much   • Alcohol use No      Comment: social on occasiona   • Drug use: Yes     Types: Marijuana      Comment: Tried edibles a few days ago   • Sexual activity: Not on file     Other Topics Concern   • Not on file     Social History Narrative   • No narrative on file       SURGICAL HISTORY  Past Surgical  "History:   Procedure Laterality Date   • TONSILLECTOMY AND ADENOIDECTOMY      age 6       CURRENT MEDICATIONS  Home Medications     Reviewed by Randy Hernandez (Pharmacy Tech) on 10/06/18 at 1130  Med List Status: Complete   Medication Last Dose Status   docusate sodium (COLACE) 100 MG Cap 10/5/2018 Active   HYDROcodone-acetaminophen (NORCO) 5-325 MG Tab per tablet 10/6/2018 Active                ALLERGIES  No Known Allergies    PHYSICAL EXAM  VITAL SIGNS: /87   Pulse (!) 104   Temp 36.9 °C (98.5 °F)   Resp 20   Ht 1.778 m (5' 10\")   Wt 61.2 kg (134 lb 14.7 oz)   SpO2 97%   BMI 19.36 kg/m²    Constitutional awake alert appears uncomfortable no acute cardiopulmonary distress per  HENT: Normocephalic, Atraumatic, Bilateral external ears normal, Oropharynx dry, No oral exudates, Nose normal.   Eyes: PERRL, EOMI, Conjunctiva normal, No discharge.   Neck: Normal range of motion, No tenderness, Supple, No stridor.  Cardiovascular: Normal heart rate, Normal rhythm, No murmurs, No rubs, No gallops.   Thorax & Lungs: Normal breath sounds, No respiratory distress, No wheezing,   Abdomen: Bowel sounds normal, diffusely tender.  No peritonitis.  Skin: Warm, Dry, No erythema, No rash.   Back: No tenderness, No CVA tenderness.   Musculoskeletal: Good range of motion in all major joints.  Neurologic: Alert, No focal deficits noted.   Psychiatric: Affect normal      Results for orders placed or performed during the hospital encounter of 10/06/18   CBC WITH DIFFERENTIAL   Result Value Ref Range    WBC 19.1 (H) 4.8 - 10.8 K/uL    RBC 4.84 4.70 - 6.10 M/uL    Hemoglobin 15.4 14.0 - 18.0 g/dL    Hematocrit 42.6 42.0 - 52.0 %    MCV 88.0 81.4 - 97.8 fL    MCH 31.8 27.0 - 33.0 pg    MCHC 36.2 (H) 33.7 - 35.3 g/dL    RDW 40.1 35.9 - 50.0 fL    Platelet Count 477 (H) 164 - 446 K/uL    MPV 8.5 (L) 9.0 - 12.9 fL    Neutrophils-Polys 79.50 (H) 44.00 - 72.00 %    Lymphocytes 8.30 (L) 22.00 - 41.00 %    Monocytes 11.20 0.00 " - 13.40 %    Eosinophils 0.20 0.00 - 6.90 %    Basophils 0.30 0.00 - 1.80 %    Immature Granulocytes 0.50 0.00 - 0.90 %    Nucleated RBC 0.00 /100 WBC    Neutrophils (Absolute) 15.19 (H) 1.82 - 7.42 K/uL    Lymphs (Absolute) 1.59 1.00 - 4.80 K/uL    Monos (Absolute) 2.13 (H) 0.00 - 0.85 K/uL    Eos (Absolute) 0.04 0.00 - 0.51 K/uL    Baso (Absolute) 0.05 0.00 - 0.12 K/uL    Immature Granulocytes (abs) 0.09 0.00 - 0.11 K/uL    NRBC (Absolute) 0.00 K/uL   COMP METABOLIC PANEL   Result Value Ref Range    Sodium 128 (L) 135 - 145 mmol/L    Potassium 3.5 (L) 3.6 - 5.5 mmol/L    Chloride 89 (L) 96 - 112 mmol/L    Co2 22 20 - 33 mmol/L    Anion Gap 17.0 (H) 0.0 - 11.9    Glucose 122 (H) 65 - 99 mg/dL    Bun 12 8 - 22 mg/dL    Creatinine 0.99 0.50 - 1.40 mg/dL    Calcium 9.7 8.4 - 10.2 mg/dL    AST(SGOT) 22 12 - 45 U/L    ALT(SGPT) 10 2 - 50 U/L    Alkaline Phosphatase 52 30 - 99 U/L    Total Bilirubin 1.4 0.1 - 1.5 mg/dL    Albumin 4.0 3.2 - 4.9 g/dL    Total Protein 7.2 6.0 - 8.2 g/dL    Globulin 3.2 1.9 - 3.5 g/dL    A-G Ratio 1.3 g/dL   LIPASE   Result Value Ref Range    Lipase 21 7 - 58 U/L   LACTIC ACID   Result Value Ref Range    Lactic Acid 2.4 (H) 0.5 - 2.0 mmol/L   ESTIMATED GFR   Result Value Ref Range    GFR If African American >60 >60 mL/min/1.73 m 2    GFR If Non African American >60 >60 mL/min/1.73 m 2        RADIOLOGY/PROCEDURES  CT-ABDOMEN-PELVIS WITH   Final Result      3.5 x 4.2 x 4.3 cm mass in the tail of pancreas highly suspicious for adenocarcinoma the pancreas.      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      Slight interval increase in small amount of perihepatic, perisplenic and pelvic ascites.      Diverticulosis without evidence of diverticulitis.            COURSE & MEDICAL DECISION MAKING  Pertinent Labs & Imaging studies reviewed. (See chart for details)    The patient's chart was reviewed from his previous workup.  He presented  with worsening of the pain.  There is a diagnosis includes pancreatic mass, infection, abscess, comp occasions with erosion into the bowel to name a few.    Chart from previous visits and baseline workup and labs from previous visit have been reviewed.    I patient was given IV fluid hydration because he is clinically dehydrated.  He has had decreased oral intake secondary to pain and nausea for several days dry mucous membranes.  This is further supported by abnormal laboratory values.  He was unable to be given a p.o. challenge because of possible surgical diagnosis and he is unable to tolerate oral fluids.    He is reassessed after IV fluids and is improved      The patient will be admitted to the hospital for continued workup and treatment.  He will be admitted for pain control.  GI will be consulted.  He is admitted in guarded condition.    FINAL IMPRESSION  1. Acute abdominal pain    2. Pancreatic mass    3. Intractable pain        3.         Electronically signed by: Chandler Plascencia, 10/6/2018 11:42 AM

## 2018-10-06 NOTE — ASSESSMENT & PLAN NOTE
New diagnosis  GI consulted had EUS with biopsy done on 10/8/18 and results came for adenocarcinoma of pancreas  Discussed with pt and family and they have decided for hospice. Hospice referral  placed  Pain management

## 2018-10-07 PROBLEM — Z66 DNR (DO NOT RESUSCITATE): Status: ACTIVE | Noted: 2018-01-01

## 2018-10-07 NOTE — PROGRESS NOTES
Received report from night RN.  Assumed pt care.  Pt is resting comfortably.  Pain tolerable at this time.  No other needs at this time.

## 2018-10-07 NOTE — CONSULTS
Gastroenterology Consult Note:    SantosterranceDixieYajaira Bennettieh  Date & Time note created:    10/6/2018   8:40 PM     Patient ID:  Name:             Uriel Branham  YOB: 1950  Age:                 68 y.o.  male  MRN:               [id number]    Referring MD:  Dr. Davidson                                                             Chief Complaint(s):      Abd pain, pancreatic mass    History of Present Illness:    This is a very pleasant 68 y.o. male who is otherwise healthy in his entire adult life.     He had renal colic before. 2.5 months ago, he developed abd pain, thought it was renal stone, took Potassium citrate, did not work well. He went to Urgent Care, and an Abd US showed a pancreatic tail tumor. He came to [facility] ER for worsening abd pain in the past 2 weeks, unable to sleep. He had to stop working 3 weeks ago. He denied EtOH and smoking. His mother might have had liver cancer. He denied H/o jaundice. He denied EGD or Colonoscopy history. He has lost about 10 lbs in the past 1 month. He has lost his appetite. Patient describes the pain as 7 out of 10 in severity and sometimes shoots up to 10 out of 10 in severity, stabbing, dull pain.  No exacerbating or relieving factors.  On today's admission repeat CT does show increase in size of this mass.      On average, the patient has 1 BM a day, mostly type 3 or 4 on the [place] stool chart. The patient does take NSAIDs (Advil 1 tab prn for abd pain), usually with food in the stomach. Otherwise the patient is doing fine without complaints of fever/chills/dysphagia/odynophagia/heartburn/nausea/vomiting/bloating/constipation/diarrhea/melena/hematochezia or abdominal pain.    Review of Systems:      Constitutional: Denies fevers, weight changes  Eyes: Denies changes in vision, jaundice  Ears/Nose/Throat/Mouth: Denies nasal congestion or sore throat   Cardiovascular: Denies chest pain or palpitations   Respiratory: Denies shortness of breath, denies  cough  Gastrointestinal/Hepatic: Pos abdominal pain, denies nausea, vomiting, diarrhea, constipation or GI bleeding   Genitourinary: Denies dysuria or frequency  Musculoskeletal/Rheum: Denies  joint pain and swelling, edema  Skin: Denies rash  Neurological: Denies headache, confusion, memory loss or focal weakness/parasthesias  Psychiatric: denies mood disorder   Endocrine: Tere thyroid problems  Heme/Oncology/Lymph Nodes: Denies enlarged lymph nodes, denies brusing or known bleeding disorder  All other systems were reviewed and are negative (AMA/CMS criteria)              Past Medical History:   History reviewed. No pertinent past medical history.  Active Hospital Problems    Diagnosis   • Pancreatic mass [K86.9]     Priority: High   • AP (abdominal pain) [R10.9]   • Leukocytosis [D72.829]   • Hypokalemia [E87.6]   • Hyponatremia [E87.1]       Past Surgical History:  Past Surgical History:   Procedure Laterality Date   • TONSILLECTOMY AND ADENOIDECTOMY      age 6       Hospital Medications:  Current Facility-Administered Medications   Medication Dose Frequency Provider Last Rate Last Dose   • NS infusion   Continuous Ced Davidson M.D. 100 mL/hr at 10/06/18 1341 1,000 mL at 10/06/18 1341   • ondansetron (ZOFRAN) syringe/vial injection 4 mg  4 mg Q4HRS PRN Ced Davidson M.D.       • ondansetron (ZOFRAN ODT) dispertab 4 mg  4 mg Q4HRS PRN Ced Davidson M.D.       • Pharmacy Consult Request ...Pain Management Review   PRN Ced Davidson M.D.        And   • oxyCODONE immediate-release (ROXICODONE) tablet 2.5 mg  2.5 mg Q3HRS PRN Ced Davidson M.D.        And   • oxyCODONE immediate-release (ROXICODONE) tablet 5 mg  5 mg Q3HRS PRN Ced Davidson M.D.   5 mg at 10/06/18 1805   • morphine ER (MS CONTIN) tablet 15 mg  15 mg Q12HRS Ced Davidson M.D.   15 mg at 10/06/18 1342   • HYDROmorphone (DILAUDID) injection 0.25 mg  0.25 mg Q3HRS PRN Ced Davidson M.D.   0.25 mg at 10/06/18 3486  "    Last reviewed on 10/6/2018 11:30 AM by Randy Hernandez    Current Outpatient Medications:  Prescriptions Prior to Admission   Medication Sig Dispense Refill Last Dose   • HYDROcodone-acetaminophen (NORCO) 5-325 MG Tab per tablet Take 1 Tab by mouth every 12 hours as needed for up to 15 days. 30 Tab 0 10/6/2018 at 0400   • docusate sodium (COLACE) 100 MG Cap Take 1 Cap by mouth 2 times a day. 60 Cap 0 10/5/2018 at pm       Medication Allergy:  No Known Allergies    Family History:  Family History   Problem Relation Age of Onset   • Cancer Mother         Liver        Social History:  Social History     Social History   • Marital status: Single     Spouse name: N/A   • Number of children: N/A   • Years of education: N/A     Occupational History   • Not on file.     Social History Main Topics   • Smoking status: Former Smoker   • Smokeless tobacco: Never Used      Comment: Young age - no long and not much   • Alcohol use No      Comment: social on occasiona   • Drug use: Yes     Types: Marijuana      Comment: Tried edibles a few days ago   • Sexual activity: Not on file     Other Topics Concern   • Not on file     Social History Narrative   • No narrative on file       Physical Exam:  Weight/BMI: Body mass index is 19.36 kg/m².  Blood pressure 160/98, pulse (!) 104, temperature 36.8 °C (98.2 °F), resp. rate 18, height 1.778 m (5' 10\"), weight 61.2 kg (134 lb 14.7 oz), SpO2 94 %.  Vitals:    10/06/18 1418 10/06/18 1500 10/06/18 1800 10/06/18 1936   BP:  (!) 170/99 160/84 160/98   Pulse:  95 97 (!) 104   Resp: (!) 43 (!) 22 18 18   Temp:  36.5 °C (97.7 °F)  36.8 °C (98.2 °F)   SpO2:  100%  94%   Weight:       Height:         Oxygen Therapy:  Pulse Oximetry: 94 %, O2 (LPM): 0, O2 Delivery: None (Room Air)    Intake/Output Summary (Last 24 hours) at 10/06/18 2040  Last data filed at 10/06/18 1700   Gross per 24 hour   Intake              300 ml   Output                0 ml   Net              300 ml "       Constitutional:   Well developed, well nourished, no acute distress  HEENT:  Normocephalic, Atraumatic, Conjunctiva not pale, Sclera not icteric, Oropharynx moist mucous membranes, No oral exudates, Nose normal.  No thyromegaly.  Neck:  Normal range of motion, No cervical tenderness,  no JVD.  Chest/Lungs:  Symmetric expansion, no spider angioma, breath sounds clear to auscultation bilaterally,  no crackles, no wheezing.   Cardiovascular:  Normal heart rate, Normal rhythm, No murmurs, No rubs, No gallops.    Abdomen: Bowel sounds normal, distended, mild diffuse tenderness, No guarding, No rebound, No masses, No hepatosplenomegaly.  Extremities: No cyanosis/clubbing/edema/palmar erythema/flapping tremor  Skin: Warm, Dry, No erythema, No rash, no induration.    MDM (Data Review):     Records reviewed and summarized in current documentation    Lab Data Review:  Recent Results (from the past 24 hour(s))   CBC WITH DIFFERENTIAL    Collection Time: 10/06/18 11:20 AM   Result Value Ref Range    WBC 19.1 (H) 4.8 - 10.8 K/uL    RBC 4.84 4.70 - 6.10 M/uL    Hemoglobin 15.4 14.0 - 18.0 g/dL    Hematocrit 42.6 42.0 - 52.0 %    MCV 88.0 81.4 - 97.8 fL    MCH 31.8 27.0 - 33.0 pg    MCHC 36.2 (H) 33.7 - 35.3 g/dL    RDW 40.1 35.9 - 50.0 fL    Platelet Count 477 (H) 164 - 446 K/uL    MPV 8.5 (L) 9.0 - 12.9 fL    Neutrophils-Polys 79.50 (H) 44.00 - 72.00 %    Lymphocytes 8.30 (L) 22.00 - 41.00 %    Monocytes 11.20 0.00 - 13.40 %    Eosinophils 0.20 0.00 - 6.90 %    Basophils 0.30 0.00 - 1.80 %    Immature Granulocytes 0.50 0.00 - 0.90 %    Nucleated RBC 0.00 /100 WBC    Neutrophils (Absolute) 15.19 (H) 1.82 - 7.42 K/uL    Lymphs (Absolute) 1.59 1.00 - 4.80 K/uL    Monos (Absolute) 2.13 (H) 0.00 - 0.85 K/uL    Eos (Absolute) 0.04 0.00 - 0.51 K/uL    Baso (Absolute) 0.05 0.00 - 0.12 K/uL    Immature Granulocytes (abs) 0.09 0.00 - 0.11 K/uL    NRBC (Absolute) 0.00 K/uL   COMP METABOLIC PANEL    Collection Time: 10/06/18 11:20 AM    Result Value Ref Range    Sodium 128 (L) 135 - 145 mmol/L    Potassium 3.5 (L) 3.6 - 5.5 mmol/L    Chloride 89 (L) 96 - 112 mmol/L    Co2 22 20 - 33 mmol/L    Anion Gap 17.0 (H) 0.0 - 11.9    Glucose 122 (H) 65 - 99 mg/dL    Bun 12 8 - 22 mg/dL    Creatinine 0.99 0.50 - 1.40 mg/dL    Calcium 9.7 8.4 - 10.2 mg/dL    AST(SGOT) 22 12 - 45 U/L    ALT(SGPT) 10 2 - 50 U/L    Alkaline Phosphatase 52 30 - 99 U/L    Total Bilirubin 1.4 0.1 - 1.5 mg/dL    Albumin 4.0 3.2 - 4.9 g/dL    Total Protein 7.2 6.0 - 8.2 g/dL    Globulin 3.2 1.9 - 3.5 g/dL    A-G Ratio 1.3 g/dL   LIPASE    Collection Time: 10/06/18 11:20 AM   Result Value Ref Range    Lipase 21 7 - 58 U/L   LACTIC ACID    Collection Time: 10/06/18 11:20 AM   Result Value Ref Range    Lactic Acid 2.4 (H) 0.5 - 2.0 mmol/L   ESTIMATED GFR    Collection Time: 10/06/18 11:20 AM   Result Value Ref Range    GFR If African American >60 >60 mL/min/1.73 m 2    GFR If Non African American >60 >60 mL/min/1.73 m 2   Magnesium    Collection Time: 10/06/18 11:20 AM   Result Value Ref Range    Magnesium 2.1 1.5 - 2.5 mg/dL       MDM (Assessment and Plan):     Active Hospital Problems    Diagnosis   • Pancreatic mass [K86.9]     Priority: High   • AP (abdominal pain) [R10.9]   • Leukocytosis [D72.829]   • Hypokalemia [E87.6]   • Hyponatremia [E87.1]       Imaging/Procedures Review:    9/19/2018 Abd US: Mass in pancreatic tail measures 5.5 x 3.1 x 4.0 cm. The lesion is not well visualized due to overlying bowel gas. Vascularity of the lesion cannot be evaluated. Differential diagnosis does include pancreatic carcinoma.    10/6/2018 CT:  3.5 x 4.2 x 4.3 cm mass in the tail of pancreas highly suspicious for adenocarcinoma the pancreas.  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  Slight interval increase in small amount of perihepatic, perisplenic and pelvic ascites.  Diverticulosis without evidence of  diverticulitis.    Assessment  - Pancreatic tail mass, 5.5 cm  - Abd pain, likely from vascular congestion and abd distention  - Bloating  - Constipation  - Weight loss  - Poor appetite  - Hyponatremia  - Hypokalemia    Plan  - Pain control  - Simethicone for bloating  - Miralax for constipation  - Check CA 19-9  - Possible EUS with FNA on Monday, also for staging     Risks, benefits, and alternatives were discussed with patient. Consenting persons were given an opportunity to ask questions and discuss other options. Risks including but not limited to failed or incomplete endoscopy, ineffective therapy, perforation, infection, bleeding, missed lesion(s), cardiac and/or pulmonary event, aspiration, stroke, possible need for surgery, hospitalization possibly prolonged, discomfort, unsuccessful and/or incomplete procedure, possible need for repeat procedures and/or additional testings, damage to adjacent organs and/or vascular structures, medication reaction, disability, death, and other adverse events possibly life-threatening. Discussion was undertaken with Layman's terms. Consenting persons stated understanding and acceptance of these risks, and wished to proceed. Consent was given in clear state of mind. All questions were answered.    Thank you very much for allowing me to participate in the care of your patient.  Please feel free to contact me anytime at [phone].     Dave Levy M.D.    Core Quality Measures   Reviewed items::  Labs, Medications and Radiology reports reviewed

## 2018-10-07 NOTE — DISCHARGE PLANNING
Care Transition Team Assessment    Met with pt at bedside to complete assessment. Pt confirmed accuracy of information listed on facesheet. Pt was independent with all ADL's and IADL's prior to admission. Pt states he recently established with PCP two weeks ago. He uses [facility] on [facility]. Pt identifies his three daughters and ex wife has his support system. He states daughters live in [place] but are involved and visit often.     Information Source  Information Given By: Patient         Elopement Risk  Legal Hold: No  Ambulatory or Self Mobile in Wheelchair: Yes  Disoriented: No  Psychiatric Symptoms: None  History of Wandering: No  Elopement this Admit: No  Vocalizing Wanting to Leave: No  Displays Behaviors, Body Language Wanting to Leave: No-Not at Risk for Elopement  Elopement Risk: Not at Risk for Elopement    Interdisciplinary Discharge Planning  Does Admitting Nurse Feel This Could be a Complex Discharge?: No  Lives with - Patient's Self Care Capacity: Spouse  Patient or legal guardian wants to designate a caregiver (see row info): No  Support Systems: Children, Family Member(s)  Housing / Facility: [address] [place] [address]  Do You Take your Prescribed Medications Regularly: Yes  Able to Return to Previous ADL's: Yes  Mobility Issues: No  Prior Services: None  Assistance Needed: No  Durable Medical Equipment: Not Applicable    Discharge Preparedness  What is your plan after discharge?: Uncertain - pending medical team collaboration  What are your discharge supports?: Child  Prior Functional Level: Ambulatory, Independent with Activities of Daily Living, Independent with Medication Management  Difficulity with ADLs: None  Difficulity with IADLs: None    Functional Assesment  Prior Functional Level: Ambulatory, Independent with Activities of Daily Living, Independent with Medication Management    Finances  Financial Barriers to Discharge: No    Vision / Hearing Impairment  Vision Impairment : Yes  Right Eye  Vision: Impaired, Wears Glasses  Left Eye Vision: Impaired, Wears Glasses  Hearing Impairment : No    Values / Beliefs / Concerns  Values / Beliefs Concerns : No         Domestic Abuse  Have you ever been the victim of abuse or violence?: No  Physical Abuse or Sexual Abuse: No  Verbal Abuse or Emotional Abuse: No  Possible Abuse Reported to:: Not Applicable    Psychological Assessment  History of Substance Abuse: None  History of Psychiatric Problems: No  Non-compliant with Treatment: No  Newly Diagnosed Illness: Yes    Discharge Risks or Barriers  Discharge risks or barriers?: No    Anticipated Discharge Information  Anticipated discharge disposition: Home  Discharge Address: [facility] [address]  Discharge Contact Phone Number: [phone]

## 2018-10-07 NOTE — PROGRESS NOTES
Gastroenterology Consult Note:    SantosterranceDixieYajaira Bennettieh  Date & Time note created:    10/7/2018   12:54 PM     Patient ID:  Name:             Uriel Branham  YOB: 1950  Age:                 68 y.o.  male  MRN:               [id number]    Referring MD:  Dr. Davidson                                                             Chief Complaint(s):      Abd pain, pancreatic mass    History of Present Illness:    This is a very pleasant 68 y.o. male who is otherwise healthy in his entire adult life.     He had renal colic before. 2.5 months ago, he developed abd pain, thought it was renal stone, took Potassium citrate, did not work well. He went to Urgent Care, and an Abd US showed a pancreatic tail tumor. He came to [facility] ER for worsening abd pain in the past 2 weeks, unable to sleep. He had to stop working 3 weeks ago. He denied EtOH and smoking. His mother might have had liver cancer. He denied H/o jaundice. He denied EGD or Colonoscopy history. He has lost about 10 lbs in the past 1 month. He has lost his appetite. Patient describes the pain as 7 out of 10 in severity and sometimes shoots up to 10 out of 10 in severity, stabbing, dull pain.  No exacerbating or relieving factors.  On today's admission repeat CT does show increase in size of this mass.      On average, the patient has 1 BM a day, mostly type 3 or 4 on the [place] stool chart. The patient does take NSAIDs (Advil 1 tab prn for abd pain), usually with food in the stomach. Otherwise the patient is doing fine without complaints of fever/chills/dysphagia/odynophagia/heartburn/nausea/vomiting/bloating/constipation/diarrhea/melena/hematochezia.  ===  10/7/2018 Doing fine. Pain tolerable. 3 daughters from [place] are all here. No questions on EUS FNA tomorrow.       Review of Systems:      Constitutional: Denies fevers, weight changes  Eyes: Denies changes in vision, jaundice  Ears/Nose/Throat/Mouth: Denies nasal congestion or sore throat    Cardiovascular: Denies chest pain or palpitations   Respiratory: Denies shortness of breath, denies cough  Gastrointestinal/Hepatic: Pos abdominal pain, denies nausea, vomiting, diarrhea, constipation or GI bleeding   Genitourinary: Denies dysuria or frequency  Musculoskeletal/Rheum: Denies  joint pain and swelling, edema  Skin: Denies rash  Neurological: Denies headache, confusion, memory loss or focal weakness/parasthesias  Psychiatric: denies mood disorder   Endocrine: Tere thyroid problems  Heme/Oncology/Lymph Nodes: Denies enlarged lymph nodes, denies brusing or known bleeding disorder  All other systems were reviewed and are negative (AMA/CMS criteria)              Past Medical History:   History reviewed. No pertinent past medical history.  Active Hospital Problems    Diagnosis   • Pancreatic mass [K86.9]     Priority: High   • AP (abdominal pain) [R10.9]   • Leukocytosis [D72.829]   • Hypokalemia [E87.6]   • Hyponatremia [E87.1]       Past Surgical History:  Past Surgical History:   Procedure Laterality Date   • TONSILLECTOMY AND ADENOIDECTOMY      age 6       Hospital Medications:  Current Facility-Administered Medications   Medication Dose Frequency Provider Last Rate Last Dose   • lisinopril (PRINIVIL) tablet 10 mg  10 mg Q DAY Kal Monsalve M.D.   10 mg at 10/07/18 1002   • morphine ER (MS CONTIN) tablet 30 mg  30 mg Q12HRS Kal Monsalve M.D.       • HYDROmorphone (DILAUDID) injection 0.5 mg  0.5 mg Q3HRS PRN Kal Monsalve M.D.       • lactulose 20 GM/30ML solution 30 mL  30 mL TID Kal Monsalve M.D.       • NS infusion   Continuous Ced Davidson M.D. 100 mL/hr at 10/06/18 1341 1,000 mL at 10/06/18 1341   • ondansetron (ZOFRAN) syringe/vial injection 4 mg  4 mg Q4HRS PRN Ced Davidson M.D.       • ondansetron (ZOFRAN ODT) dispertab 4 mg  4 mg Q4HRS PRN Ced Davidson M.D.       • Pharmacy Consult Request ...Pain Management Review   PRN Ced Davidson M.D.        And   •  "oxyCODONE immediate-release (ROXICODONE) tablet 2.5 mg  2.5 mg Q3HRS PRN Ced Davidson M.D.        And   • oxyCODONE immediate-release (ROXICODONE) tablet 5 mg  5 mg Q3HRS PRN Ced Davidson M.D.   5 mg at 10/07/18 1003   • simethicone (MYLICON) chewable tab 80 mg  80 mg 4X/DAY ACHS Dave Levy M.D.       • polyethylene glycol/lytes (MIRALAX) PACKET 1 Packet  1 Packet BID Dave Levy M.D.   1 Packet at 10/07/18 0550     Last reviewed on 10/6/2018 11:30 AM by Randy Hernandez    Current Outpatient Medications:  Prescriptions Prior to Admission   Medication Sig Dispense Refill Last Dose   • HYDROcodone-acetaminophen (NORCO) 5-325 MG Tab per tablet Take 1 Tab by mouth every 12 hours as needed for up to 15 days. 30 Tab 0 10/6/2018 at 0400   • docusate sodium (COLACE) 100 MG Cap Take 1 Cap by mouth 2 times a day. 60 Cap 0 10/5/2018 at pm       Medication Allergy:  No Known Allergies    Family History:  Family History   Problem Relation Age of Onset   • Cancer Mother         Liver        Social History:  Social History     Social History   • Marital status: Single     Spouse name: N/A   • Number of children: N/A   • Years of education: N/A     Occupational History   • Not on file.     Social History Main Topics   • Smoking status: Former Smoker   • Smokeless tobacco: Never Used      Comment: Young age - no long and not much   • Alcohol use No      Comment: social on occasiona   • Drug use: Yes     Types: Marijuana      Comment: Tried edibles a few days ago   • Sexual activity: Not on file     Other Topics Concern   • Not on file     Social History Narrative   • No narrative on file       Physical Exam:  Weight/BMI: Body mass index is 19.36 kg/m².  Blood pressure (!) 171/88, pulse 91, temperature 36.7 °C (98 °F), resp. rate 18, height 1.778 m (5' 10\"), weight 61.2 kg (134 lb 14.7 oz), SpO2 97 %.  Vitals:    10/06/18 1800 10/06/18 1936 10/07/18 0200 10/07/18 0730   BP: 160/84 160/98 (!) 162/78 (!) " 171/88   Pulse: 97 (!) 104 96 91   Resp: 18 18 18 18   Temp:  36.8 °C (98.2 °F) 36.6 °C (97.8 °F) 36.7 °C (98 °F)   SpO2:  94% 96% 97%   Weight:       Height:         Oxygen Therapy:  Pulse Oximetry: 97 %, O2 (LPM): 0, O2 Delivery: None (Room Air)    Intake/Output Summary (Last 24 hours) at 10/07/18 1254  Last data filed at 10/06/18 2000   Gross per 24 hour   Intake              540 ml   Output                0 ml   Net              540 ml       Constitutional:   Well developed, well nourished, no acute distress  HEENT:  Normocephalic, Atraumatic, Conjunctiva not pale, Sclera not icteric, Oropharynx moist mucous membranes, No oral exudates, Nose normal.  No thyromegaly.  Neck:  Normal range of motion, No cervical tenderness,  no JVD.  Chest/Lungs:  Symmetric expansion, no spider angioma, breath sounds clear to auscultation bilaterally,  no crackles, no wheezing.   Cardiovascular:  Normal heart rate, Normal rhythm, No murmurs, No rubs, No gallops.    Abdomen: Bowel sounds normal, distended, mild diffuse tenderness, No guarding, No rebound, No masses, No hepatosplenomegaly.  Extremities: No cyanosis/clubbing/edema/palmar erythema/flapping tremor  Skin: Warm, Dry, No erythema, No rash, no induration.    MDM (Data Review):     Records reviewed and summarized in current documentation    Lab Data Review:  Recent Results (from the past 24 hour(s))   CBC with Differential    Collection Time: 10/07/18  5:17 AM   Result Value Ref Range    WBC 14.6 (H) 4.8 - 10.8 K/uL    RBC 4.61 (L) 4.70 - 6.10 M/uL    Hemoglobin 14.5 14.0 - 18.0 g/dL    Hematocrit 42.2 42.0 - 52.0 %    MCV 91.5 81.4 - 97.8 fL    MCH 31.5 27.0 - 33.0 pg    MCHC 34.4 33.7 - 35.3 g/dL    RDW 43.2 35.9 - 50.0 fL    Platelet Count 308 164 - 446 K/uL    MPV 8.7 (L) 9.0 - 12.9 fL    Neutrophils-Polys 80.30 (H) 44.00 - 72.00 %    Lymphocytes 7.60 (L) 22.00 - 41.00 %    Monocytes 10.40 0.00 - 13.40 %    Eosinophils 0.80 0.00 - 6.90 %    Basophils 0.30 0.00 - 1.80 %     Immature Granulocytes 0.60 0.00 - 0.90 %    Nucleated RBC 0.00 /100 WBC    Neutrophils (Absolute) 11.70 (H) 1.82 - 7.42 K/uL    Lymphs (Absolute) 1.10 1.00 - 4.80 K/uL    Monos (Absolute) 1.51 (H) 0.00 - 0.85 K/uL    Eos (Absolute) 0.12 0.00 - 0.51 K/uL    Baso (Absolute) 0.04 0.00 - 0.12 K/uL    Immature Granulocytes (abs) 0.09 0.00 - 0.11 K/uL    NRBC (Absolute) 0.00 K/uL   Comp Metabolic Panel (CMP)    Collection Time: 10/07/18  5:17 AM   Result Value Ref Range    Sodium 130 (L) 135 - 145 mmol/L    Potassium 3.7 3.6 - 5.5 mmol/L    Chloride 96 96 - 112 mmol/L    Co2 23 20 - 33 mmol/L    Anion Gap 11.0 0.0 - 11.9    Glucose 114 (H) 65 - 99 mg/dL    Bun 7 (L) 8 - 22 mg/dL    Creatinine 0.92 0.50 - 1.40 mg/dL    Calcium 8.8 8.4 - 10.2 mg/dL    AST(SGOT) 11 (L) 12 - 45 U/L    ALT(SGPT) 7 2 - 50 U/L    Alkaline Phosphatase 43 30 - 99 U/L    Total Bilirubin 1.1 0.1 - 1.5 mg/dL    Albumin 3.3 3.2 - 4.9 g/dL    Total Protein 6.0 6.0 - 8.2 g/dL    Globulin 2.7 1.9 - 3.5 g/dL    A-G Ratio 1.2 g/dL   CEA    Collection Time: 10/07/18  5:17 AM   Result Value Ref Range    Carcinoembryonic Antigen 1.1 0.0 - 3.0 ng/mL   CA 19-9    Collection Time: 10/07/18  5:17 AM   Result Value Ref Range    Ca 19-9 51.9 (H) 0.0 - 35.0 U/mL   ESTIMATED GFR    Collection Time: 10/07/18  5:17 AM   Result Value Ref Range    GFR If African American >60 >60 mL/min/1.73 m 2    GFR If Non African American >60 >60 mL/min/1.73 m 2       MDM (Assessment and Plan):     Active Hospital Problems    Diagnosis   • Pancreatic mass [K86.9]     Priority: High   • AP (abdominal pain) [R10.9]   • Leukocytosis [D72.829]   • Hypokalemia [E87.6]   • Hyponatremia [E87.1]       Imaging/Procedures Review:    9/19/2018 Abd US: Mass in pancreatic tail measures 5.5 x 3.1 x 4.0 cm. The lesion is not well visualized due to overlying bowel gas. Vascularity of the lesion cannot be evaluated. Differential diagnosis does include pancreatic carcinoma.    10/6/2018 CT:  3.5 x  4.2 x 4.3 cm mass in the tail of pancreas highly suspicious for adenocarcinoma the pancreas.  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  Slight interval increase in small amount of perihepatic, perisplenic and pelvic ascites.  Diverticulosis without evidence of diverticulitis.    Assessment  - Pancreatic tail mass, 5.5 cm  - Abd pain, likely from vascular congestion and abd distention  - Bloating  - Constipation  - Weight loss  - Poor appetite  - Hyponatremia  - Hypokalemia    Plan  - Pain control  - Simethicone for bloating  - Miralax for constipation  - Pending CA 19-9  - EUS with FNA on Monday, also for staging, pending notification from OR about the exact time or procedure  - Dr. Ignacio Mohamud will be doing the procedure on Monday and will continue to follow the patient.    Risks, benefits, and alternatives were discussed with patient. Consenting persons were given an opportunity to ask questions and discuss other options. Risks including but not limited to failed or incomplete endoscopy, ineffective therapy, perforation, infection, bleeding, missed lesion(s), cardiac and/or pulmonary event, aspiration, stroke, possible need for surgery, hospitalization possibly prolonged, discomfort, unsuccessful and/or incomplete procedure, possible need for repeat procedures and/or additional testings, damage to adjacent organs and/or vascular structures, medication reaction, disability, death, and other adverse events possibly life-threatening. Discussion was undertaken with Layman's terms. Consenting persons stated understanding and acceptance of these risks, and wished to proceed. Consent was given in clear state of mind. All questions were answered.    Thank you very much for allowing me to participate in the care of your patient.  Please feel free to contact me anytime at [phone].     Dave Levy M.D.    Core Quality Measures   Reviewed items::  Labs,  Medications and Radiology reports reviewed

## 2018-10-07 NOTE — CARE PLAN
Problem: Safety  Goal: Will remain free from injury  Outcome: PROGRESSING AS EXPECTED    Intervention: Provide assistance with mobility   10/07/18 0234   OTHER   Assistance / Tolerance No assistance required;Tolerates Well         Problem: Pain Management  Goal: Pain level will decrease to patient's comfort goal  Outcome: PROGRESSING AS EXPECTED

## 2018-10-07 NOTE — PROGRESS NOTES
[facility] Hospitalist Progress Note    Date of Service: 10/7/2018    Chief Complaint  68 y.o. male admitted 10/6/2018 with abdominal pain found to have a pancreatic mass.     Interval Problem Update  Pain uncontrolled. Needing frequent IV doses of pain meds on top of already escalating oral doses to control pain. We discussed possible diagnosis and plan for ercp and biopsy.   We also discussed code status today for 16min. This included cpr and intubation discussion. He has elected to be a DNR. Orders placed into the chart.     K replaced  Hypertensive- lisinopril added    Consultants/Specialty  GI    Disposition  Needs hospitalization for IV pain meds and escalation of oral pain meds.     Ct abd:  3.5 x 4.2 x 4.3 cm mass in the tail of pancreas highly suspicious for adenocarcinoma the pancreas.  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  Slight interval increase in small amount of perihepatic, perisplenic and pelvic ascites.  Diverticulosis without evidence of diverticulitis.        Review of Systems   Constitutional: Negative for chills and fever.   HENT: Negative for sore throat.    Eyes: Negative for blurred vision and pain.   Respiratory: Negative for cough and shortness of breath.    Cardiovascular: Negative for chest pain and palpitations.   Gastrointestinal: Positive for abdominal pain and nausea. Negative for vomiting.   Genitourinary: Negative for dysuria and urgency.   Musculoskeletal: Negative for back pain and neck pain.   Skin: Negative for itching and rash.   Neurological: Negative for dizziness, tingling and headaches.   Psychiatric/Behavioral: Negative for depression. The patient does not have insomnia.    All other systems reviewed and are negative.     Physical Exam  Laboratory/Imaging   Hemodynamics  Temp (24hrs), Av.7 °C (98 °F), Min:36.5 °C (97.7 °F), Max:36.9 °C (98.5 °F)   Temperature: 36.7 °C (98 °F)  Pulse  Av.1  Min: 91  Max:  104 Heart Rate (Monitored): 100  Blood Pressure : (!) 171/88, NIBP: (!) 200/85      Respiratory      Respiration: 18, Pulse Oximetry: 97 %             Fluids    Intake/Output Summary (Last 24 hours) at 10/07/18 0832  Last data filed at 10/06/18 2000   Gross per 24 hour   Intake              540 ml   Output                0 ml   Net              540 ml       Nutrition  Orders Placed This Encounter   Procedures   • Diet Order Regular     Standing Status:   Standing     Number of Occurrences:   1     Order Specific Question:   Diet:     Answer:   Regular [1]     Physical Exam   Constitutional: He is oriented to person, place, and time. He appears well-developed and well-nourished. No distress.   Patient seen and examined  Plan discussed with RN   HENT:   Right Ear: External ear normal.   Left Ear: External ear normal.   Nose: Nose normal.   Eyes: Right eye exhibits no discharge. Left eye exhibits no discharge. No scleral icterus.   Neck: No JVD present. No tracheal deviation present.   Cardiovascular: Normal rate, normal heart sounds and intact distal pulses.    No murmur heard.  Cap refill 2sec  Pulses 2+ throughout     Pulmonary/Chest: Effort normal and breath sounds normal. No respiratory distress. He has no wheezes. He has no rales.   Abdominal: Soft. Bowel sounds are normal. He exhibits distension. There is tenderness. There is no guarding.   Musculoskeletal: He exhibits no edema or tenderness.   Neurological: He is alert and oriented to person, place, and time.   Skin: Skin is warm and dry. He is not diaphoretic. No erythema.   Normal skin color   Psychiatric: He has a normal mood and affect. His behavior is normal.   Nursing note and vitals reviewed.      Recent Labs      10/06/18   1120  10/07/18   0517   WBC  19.1*  14.6*   RBC  4.84  4.61*   HEMOGLOBIN  15.4  14.5   HEMATOCRIT  42.6  42.2   MCV  88.0  91.5   MCH  31.8  31.5   MCHC  36.2*  34.4   RDW  40.1  43.2   PLATELETCT  477*  308   MPV  8.5*  8.7*      Recent Labs      10/06/18   1120  10/07/18   0517   SODIUM  128*  130*   POTASSIUM  3.5*  3.7   CHLORIDE  89*  96   CO2  22  23   GLUCOSE  122*  114*   BUN  12  7*   CREATININE  0.99  0.92   CALCIUM  9.7  8.8                      Assessment/Plan     Pancreatic mass- (present on admission)   Assessment & Plan    New diagnosis  GI consulted for ercp and bipopsy  Ca 19-9 pending  Imaging concerning for adenocarcinoma  Causing severe pain- titration of pain meds ongoing.         DNR (do not resuscitate)   Assessment & Plan    Confirmed here        Hyponatremia   Assessment & Plan    Continue iv fluids        Hypokalemia   Assessment & Plan    Trend and replace          Leukocytosis   Assessment & Plan    Suspect leukemoid reaction, no signs of fevers  We will continue to monitor if clinical worsening will initiate antibiotics        AP (abdominal pain)   Assessment & Plan    Severe 2/2 pancreatic mass.   Increase IV dilaudid today  Increase ms contin today  Continue prn oxycodone.               Quality-Core Measures   Reviewed items::  EKG reviewed, Labs reviewed, Medications reviewed and Radiology images reviewed  [place] catheter::  No [place]  DVT prophylaxis pharmacological::  Enoxaparin (Lovenox)

## 2018-10-08 NOTE — PROGRESS NOTES
"Zofran IV given for c/o nausea and \"dry heaving\".  Patient verbalized understanding of NPO after midnight for endoscope tomorrow.    "

## 2018-10-08 NOTE — PROGRESS NOTES
Report received from Day RN, assumed care of pt at this time. POC and medications reviewed with pt. Pt verbalized understanding. Pt c/o 7/10 pain. Will medicate per MAR. Safety measures in place. Will continue to monitor.

## 2018-10-08 NOTE — PROGRESS NOTES
Patient c/o nausea, states Zofran helped with nausea but it is not due until 0311.  Hospitalist paged.

## 2018-10-08 NOTE — OR NURSING
Patient to preop, allergies and NPO status verified, home medications reconciled, belongings secured, verbalizes understanding of pain scale, surgical site verified, IV access verified.

## 2018-10-08 NOTE — PROGRESS NOTES
Assumed care of patient at 1900.  Patient is resting in bed with family at bedside.  Patient discussed plans to move from current room r/t noise of the fan but has decided to stay in current room with ear plugs provided.  Will monitor and continue hourly rounds.

## 2018-10-08 NOTE — CARE PLAN
"Problem: Venous Thromboembolism (VTW)/Deep Vein Thrombosis (DVT) Prevention:  Goal: Patient will participate in Venous Thrombosis (VTE)/Deep Vein Thrombosis (DVT)Prevention Measures  Outcome: PROGRESSING AS EXPECTED   10/07/18 2023   OTHER   Pharmacologic Prophylaxis Used LMWH: Enoxaparin(Lovenox)   Patient refused Lovenox dose on 10/7/18.    Problem: Pain Management  Goal: Pain level will decrease to patient's comfort goal  Outcome: PROGRESSING AS EXPECTED   10/07/18 2215 10/08/18 0106   OTHER   Pain Scale 0 - 10  5 --    Non Verbal Scale  --  Calm;Sleeping;Unlabored Breathing   Comfort Goal Comfort at Rest --    Patient taking MS contin Q12H for pain with oxycodone po PRN and Dilaudid for breakthrough pain.  Discussed whether he wants to be woken up for pain medication and patient states \"at this point, sleep is more important\".       "

## 2018-10-08 NOTE — PROGRESS NOTES
[facility] Hospitalist Progress Note    Date of Service: 10/8/2018    Chief Complaint  68 y.o. male admitted 10/6/2018 with abdominal pain found to have a pancreatic mass.     Interval Problem Update  Nausea/vomiting and severe pain still over night.   Iv dilaudid not frequent enough-  PCA added today.   Long discussion with daughters about planned biopsy today and prognosis. Palliative care consulted and possible hospice discussed with daughters. Patient apparently indicating to them that He may not want to seek treatment for a standard pancreatic cancer.         Consultants/Specialty  GI    Disposition  Needs hospitalization for IV pain meds and escalation of oral pain meds.     Ct abd:  3.5 x 4.2 x 4.3 cm mass in the tail of pancreas highly suspicious for adenocarcinoma the pancreas.  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  Slight interval increase in small amount of perihepatic, perisplenic and pelvic ascites.  Diverticulosis without evidence of diverticulitis.        Review of Systems   Constitutional: Negative for chills and fever.   HENT: Negative for sore throat.    Eyes: Negative for blurred vision and pain.   Respiratory: Negative for cough and shortness of breath.    Cardiovascular: Negative for chest pain and palpitations.   Gastrointestinal: Positive for abdominal pain and nausea. Negative for vomiting.   Genitourinary: Negative for dysuria and urgency.   Musculoskeletal: Negative for back pain and neck pain.   Skin: Negative for itching and rash.   Neurological: Negative for dizziness and tingling.   Psychiatric/Behavioral: Negative for depression. The patient does not have insomnia.    All other systems reviewed and are negative.     Physical Exam  Laboratory/Imaging   Hemodynamics  Temp (24hrs), Av.6 °C (97.9 °F), Min:36.5 °C (97.7 °F), Max:36.7 °C (98 °F)   Temperature: 36.6 °C (97.9 °F)  Pulse  Av.4  Min: 91  Max: 104    Blood  Pressure : 152/70      Respiratory      Respiration: 18, Pulse Oximetry: 97 %        RUL Breath Sounds: Clear, RML Breath Sounds: Clear, RLL Breath Sounds: Clear, GRIFFIN Breath Sounds: Clear, LLL Breath Sounds: Clear    Fluids    Intake/Output Summary (Last 24 hours) at 10/08/18 0852  Last data filed at 10/08/18 0800   Gross per 24 hour   Intake             2250 ml   Output                0 ml   Net             2250 ml       Nutrition  Orders Placed This Encounter   Procedures   • Diet NPO     Standing Status:   Standing     Number of Occurrences:   1     Order Specific Question:   Restrict to:     Answer:   Ice Chips [2]     Physical Exam   Constitutional: He is oriented to person, place, and time. He appears well-developed and well-nourished. No distress.   Patient seen and examined  Plan discussed with RN   MANUELT:   Right Ear: External ear normal.   Left Ear: External ear normal.   Nose: Nose normal.   Eyes: Right eye exhibits no discharge. Left eye exhibits no discharge. No scleral icterus.   Neck: No JVD present. No tracheal deviation present.   Cardiovascular: Normal rate, normal heart sounds and intact distal pulses.    No murmur heard.  Cap refill 2sec  Pulses 2+ throughout     Pulmonary/Chest: Effort normal and breath sounds normal. No respiratory distress. He has no rales.   Abdominal: Soft. Bowel sounds are normal. He exhibits distension. There is tenderness. There is no guarding.   Musculoskeletal: He exhibits no edema or tenderness.   Neurological: He is alert and oriented to person, place, and time.   Skin: Skin is warm and dry. He is not diaphoretic. No erythema.   Normal skin color   Psychiatric: He has a normal mood and affect. His behavior is normal.   Nursing note and vitals reviewed.      Recent Labs      10/06/18   1120  10/07/18   0517  10/08/18   0505   WBC  19.1*  14.6*  16.8*   RBC  4.84  4.61*  4.38*   HEMOGLOBIN  15.4  14.5  14.0   HEMATOCRIT  42.6  42.2  41.2*   MCV  88.0  91.5  94.1   MCH   31.8  31.5  32.0   MCHC  36.2*  34.4  34.0   RDW  40.1  43.2  44.2   PLATELETCT  477*  308  185   MPV  8.5*  8.7*  10.8     Recent Labs      10/06/18   1120  10/07/18   0517  10/08/18   0505   SODIUM  128*  130*  130*   POTASSIUM  3.5*  3.7  4.8   CHLORIDE  89*  96  102   CO2  22  23  19*   GLUCOSE  122*  114*  105*   BUN  12  7*  7*   CREATININE  0.99  0.92  0.85   CALCIUM  9.7  8.8  8.4                      Assessment/Plan     Pancreatic mass- (present on admission)   Assessment & Plan    New diagnosis  GI consulted for ercp and bipopsy  Ca 19-9 pending  Imaging concerning for adenocarcinoma  Causing severe pain- titration of pain meds ongoing.         DNR (do not resuscitate)   Assessment & Plan    Confirmed here        Hyponatremia   Assessment & Plan    Continue iv fluids        Hypokalemia   Assessment & Plan    Trend and replace          Leukocytosis   Assessment & Plan    Suspect leukemoid reaction, no signs of fevers  We will continue to monitor if clinical worsening will initiate antibiotics        AP (abdominal pain)   Assessment & Plan    Severe 2/2 pancreatic mass.   Increase IV dilaudid today to PCA  Given pCA will decrease ms contin back to 15. may need increase again. ms contin today  Continue prn oxycodone.   Reglan added for nausea/vomoiting  Prn haldol also for nausea added              Quality-Core Measures   Reviewed items::  EKG reviewed, Labs reviewed, Medications reviewed and Radiology images reviewed  Wakefield catheter::  No Wakefield  DVT prophylaxis pharmacological::  Enoxaparin (Lovenox)  Ulcer Prophylaxis::  Not indicated  Assessed for rehabilitation services:  Patient was assess for and/or received rehabilitation services during this hospitalization

## 2018-10-08 NOTE — PROCEDURES
Pre-procedure Diagnoses:   Mass of pancreas [K86.9]   Abnormal abdominal CT scan [R93.5]   Abdominal pain, epigastric [R10.13]   Abdominal distention [R14.0]   Anorexia [R63.0]   Abnormal weight loss [R63.4]   Post-procedure Diagnoses:   Primary cancer of tail of pancreas (HCC) [C25.2]   Gastroparesis [K31.84]   Procedures:   UPPER ENDOSCOPIC ULTRASOUND GUIDED FINE-NEEDLE ASPIRATION BIOPSIES OF PANCREAS TAIL MASS [91505 (CPT®)]   ESOPHAGOGASTRODUODENOSCOPY OR UPPER GI ENDOSCOPY WITH DUODENAL MUCOSAL BIOPSIES [95698 (CPT®)]     Endoscopist: Rishi Soto MD, [facility], [facility]    Anesthesiologist: Dr. George Yadav    Consent: Risks, benefits, and alternatives were discussed with patient and two daughters.  Consenting persons were given an opportunity to ask questions and discuss other options.  Risks including but not limited to pancreatitis, ileus, perforation, infection, bleeding, missed lesion(s), cardiac and/or pulmonary event, aspiration, stroke, possible need for surgery and/or interventional radiology, hospitalization possibly prolonged, discomfort, unsuccessful and/or incomplete procedure, indefinite diagnosis, ineffective therapy and/or persistent symptoms, possible need for repeat procedures and/or additional testings, damage to adjacent organs and/or vascular structures, medication reaction, disability, death, and other adverse events possibly life-threatening.  Discussion was undertaken with Layman's terms.  Consenting persons stated understanding and acceptance of these risks, and wished to proceed.  Informed consent was given in clear state of mind.     Endoscopic procedures in detail: Diagnostic endoscope was inserted from mouth into second portion of the duodenum, retroflexion was performed in the stomach.  Duodenal mucosal biopsies were obtained to evaluate for celiac sprue. There was suction of insufflated air and stomach fluid contents upon removal.      Curvilinear echoendoscope was inserted from  mouth to second portion of the duodenum.  Pancreas was examined with identification of normal vascular landmarks including superior mesenteric artery, superior mesenteric vein, portal vein, celiac artery, portal vein confluence, and splenorenal junction.  Biliary duct was imaged and traced from intrapancreatic portion to hepatic hilum.  Celiac axis was imaged to look for echogenic lymph nodes.    Fine-needle aspiration biopsies (FNA) were obtained via a trans-duodenal approach from pancreas tail mass with a 19-gauge SharkCore needle after Doppler studies had identified a vessel-free path for all needle biopsies.  A total of 4 (four) passes were obtained, multiple cytology slides were created and core or sampling remnants were sent to pathology.  There was suction of insufflated air and stomach fluid contents upon removal.    Procedure times:  - In-room 15:08  - Start 15:17  - Completed 15:31  - Out of room 15:41    Esophagogastroduodenoscopy Findings:  - Esophagus: endoscopically unremarkable.   - Stomach: retained fluid in stomach suggestive of gastroparesis, suctioned.   - Duodenum: endoscopically unremarkable to 2nd portion, duodenal mucosal biopsies obtained.    Endoscopic Ultrasound Findings:  - Pancreas: 4.8x4.4cm hypoechoic round pancreas tail mass with well-demarcated margins, FNA'd.  Mass invades the splenic vein and artery.  Preliminary diagnosis is positive for adenocarcinoma.  - Lymph nodes: 5 demetri-pancreatic lymph nodes (3 near pancreas tail, 2 near pancreas head), ranging in size from 4mm to 12mm, hypoechoic with well-demarcated margins, malignant appearing.  - Bile duct: No evidence of biliary stricture of obstruction  - Celiac axis: peripancreatic tail lymph nodes.  - Fine-Needle aspiration: 19-gauge SharkCore FNA x4    Impression:  1. Pancreas tail cancer, T3 N2 Mx  2. Duodenal biopsies obtained to evaluate for celiac  3. Gastroparesis    Recommendations:   1.  Routine post-endoscopy anesthesia  recovery care.  Transfer patient back to prior hospital room when he is awake, alert and comfortable, when recovery criteria are met.  Aspiration and fall precautions x 24 hours.   2.  Start reglan  3.  Clear liquid diet  4.  Lactulose enemas   5. I spoke to patient, daughters, recovery nurse and hospitalist about impression, diagnosis and recommendations.  6.  Please note that Dr. Ignacio Mohamud will be rounding on patient tomorrow.

## 2018-10-08 NOTE — PROGRESS NOTES
Gastroenterology Progress Note     Author: Ignacio Mohamud   Date & Time Created: 10/8/2018 9:12 AM    Chief Complaint:  Abdominal pain; pancreatic mass    This is a very pleasant 68 y.o. male who is otherwise healthy in his entire adult life.      He had renal colic before. 2.5 months ago, he developed abd pain, thought it was renal stone, took Potassium citrate, did not work well. He went to Urgent Care, and an Abd US showed a pancreatic tail tumor. He came to [facility] ER for worsening abd pain in the past 2 weeks, unable to sleep. He had to stop working 3 weeks ago. He denied EtOH and smoking. His mother might have had liver cancer. He denied H/o jaundice. He denied EGD or Colonoscopy history. He has lost about 10 lbs in the past 1 month. He has lost his appetite. Patient describes the pain as 7 out of 10 in severity and sometimes shoots up to 10 out of 10 in severity, stabbing, dull pain.  No exacerbating or relieving factors.  On today's admission repeat CT does show increase in size of this mass.       On average, the patient has 1 BM a day, mostly type 3 or 4 on the [place] stool chart. The patient does take NSAIDs (Advil 1 tab prn for abd pain), usually with food in the stomach. Otherwise the patient is doing fine without complaints of fever/chills/dysphagia/odynophagia/heartburn/nausea/vomiting/bloating/constipation/diarrhea/melena/hematochezia.    Interval History:  10/7/2018 Doing fine. Pain tolerable. 3 daughters from [place] are all here. No questions on EUS FNA tomorrow.   10/8/2018: doing well. Pain controlled at 5/10. No significant stool or BM since admission.     Review of Systems:  Review of Systems   Constitutional: Negative.    HENT: Negative.    Eyes: Negative.    Respiratory: Negative.    Cardiovascular: Negative.    Gastrointestinal: Positive for abdominal pain. Negative for blood in stool, constipation, diarrhea, heartburn, melena, nausea and vomiting.   Genitourinary: Negative.     Musculoskeletal: Negative.    Skin: Negative.    Neurological: Negative.    Endo/Heme/Allergies: Negative.    Psychiatric/Behavioral: Negative.           Physical Exam:  Physical Exam    Labs:        Invalid input(s): [id number]      Recent Labs      10/06/18   1120  10/07/18   0517  10/08/18   0505   SODIUM  128*  130*  130*   POTASSIUM  3.5*  3.7  4.8   CHLORIDE  89*  96  102   CO2  22  23  19*   BUN  12  7*  7*   CREATININE  0.99  0.92  0.85   MAGNESIUM  2.1   --   1.9   CALCIUM  9.7  8.8  8.4     Recent Labs      10/06/18   1120  10/07/18   0517  10/08/18   0505   ALTSGPT  10  7  5   ASTSGOT  22  11*  17   ALKPHOSPHAT  52  43  40   TBILIRUBIN  1.4  1.1  1.7*   LIPASE  21   --    --    GLUCOSE  122*  114*  105*     Recent Labs      10/06/18   1120  10/07/18   0517  10/08/18   0505   RBC  4.84  4.61*  4.38*   HEMOGLOBIN  15.4  14.5  14.0   HEMATOCRIT  42.6  42.2  41.2*   PLATELETCT  477*  308  185     Recent Labs      10/06/18   1120  10/07/18   0517  10/08/18   0505   WBC  19.1*  14.6*  16.8*   NEUTSPOLYS  79.50*  80.30*  83.20*   LYMPHOCYTES  8.30*  7.60*  6.40*   MONOCYTES  11.20  10.40  9.20   EOSINOPHILS  0.20  0.80  0.40   BASOPHILS  0.30  0.30  0.30   ASTSGOT  22  11*  17   ALTSGPT  10  7  5   ALKPHOSPHAT  52  43  40   TBILIRUBIN  1.4  1.1  1.7*     Hemodynamics:  Temp (24hrs), Av.6 °C (97.9 °F), Min:36.5 °C (97.7 °F), Max:36.7 °C (98 °F)  Temperature: 36.6 °C (97.9 °F)  Pulse  Av.4  Min: 91  Max: 104   Blood Pressure : 152/70     Respiratory:    Respiration: 18, Pulse Oximetry: 97 %        RUL Breath Sounds: Clear, RML Breath Sounds: Clear, RLL Breath Sounds: Clear, GRIFFIN Breath Sounds: Clear, LLL Breath Sounds: Clear  Fluids:    Intake/Output Summary (Last 24 hours) at 10/08/18 0912  Last data filed at 10/08/18 0800   Gross per 24 hour   Intake             2250 ml   Output                0 ml   Net             2250 ml        GI/Nutrition:  Orders Placed This Encounter   Procedures   • Diet  NPO     Standing Status:   Standing     Number of Occurrences:   1     Order Specific Question:   Restrict to:     Answer:   Ice Chips [2]     Medical Decision Making, by Problem:  Active Hospital Problems    Diagnosis   • Pancreatic mass [K86.9]   • DNR (do not resuscitate) [Z66]   • AP (abdominal pain) [R10.9]   • Hypokalemia [E87.6]   • Hyponatremia [E87.1]     Imaging/Procedures Review:    9/19/2018 Abd US: Mass in pancreatic tail measures 5.5 x 3.1 x 4.0 cm. The lesion is not well visualized due to overlying bowel gas. Vascularity of the lesion cannot be evaluated. Differential diagnosis does include pancreatic carcinoma.     10/6/2018 CT:  3.5 x 4.2 x 4.3 cm mass in the tail of pancreas highly suspicious for adenocarcinoma the pancreas.  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  Slight interval increase in small amount of perihepatic, perisplenic and pelvic ascites.  Diverticulosis without evidence of diverticulitis.     Assessment  - Pancreatic tail mass, 5.5 cm; CA19.9: 51.9 (H)  - Abd pain, likely from vascular congestion and abd distention  - Bloating  - Constipation  - Weight loss  - Poor appetite  - Hyponatremia  - Hypokalemia     Plan  - Pain control  - Simethicone for bloating  - Miralax for constipation  - EUS with FNA today at 4PM with Dr. Soto. Please obtain consent; check INR  - NPO today.   - KUB to assess for stool burden; comparison for distention of abdomen.  - Avoid lactulose given fermenting/gas generating properties. Minimize narcotics; ambulate; correct electrolyte. If no BM, need rectal tube and possible purge.      Quality-Core Measures

## 2018-10-08 NOTE — PROGRESS NOTES
Patient seen and examined, noted to have abdominal distention for days to weeks, advised patient that EGD/EUS-FNA could worsen these symptoms, he stated understanding.  Risks, benefits, and alternatives were discussed with patient and two daughters.  Consenting persons were given an opportunity to ask questions and discuss other options.  Risks including but not limited to pancreatitis, ileus, perforation, infection, bleeding, missed lesion(s), cardiac and/or pulmonary event, aspiration, stroke, possible need for surgery and/or interventional radiology, hospitalization possibly prolonged, discomfort, unsuccessful and/or incomplete procedure, indefinite diagnosis, ineffective therapy and/or persistent symptoms, possible need for repeat procedures and/or additional testings, damage to adjacent organs and/or vascular structures, medication reaction, disability, death, and other adverse events possibly life-threatening.  Discussion was undertaken with Layman's terms.  Consenting persons stated understanding and acceptance of these risks, and wished to proceed.  Informed consent was given in clear state of mind.

## 2018-10-08 NOTE — OR NURSING
"1555 Pt sitting up at 90 degrees on own on [facility]. Pt reports baseline abdominal pain but \"certainly no worse from the procedure\" and reports baseline pressure. Pt denies nausea. Pt reports baseline of 5-7 abdominal pain. Breathing easy and unlabored; pleasant and cooperative. Pt unable to utilize pain scale at this time; even after pain scale explanation. Active BS x 4Q; firm but non-tender abdomen.   1610 Pt remains awake, sitting up at 90 degrees with legs crossed  style but calm and cooperative. Pt agrees to Reglan IV as ordered.   1620 Pt meets criteria for transfer to [facility]. Given mouth swabs for comfort.   "

## 2018-10-09 NOTE — PROGRESS NOTES
Plan of care reviewed with pt. Patient is alert and oriented times 4. Pain managed well with pca. Pt turned and repositioned self frequently. No acute events this shift. See flow sheets for further information.

## 2018-10-09 NOTE — CONSULTS
"Palliative Care H&P    Author: Carmencita Santos    Date & Time note created:    10/9/2018   9:49 AM       Date of Consult: 10/8/18  Requesting Physician: Kal Monsalve  Consulting Physician: Carmencita Santos MD  Reason for Consult: Pain Management    History of Present Illness:    Mr. Branham is a 69 YO man with no significant past medical history and recent diagnosis of Pancreatic cancer (10/8/18), who had presented to the ED on 10/6/18 for evaluation of abdominal pain that started 2 weeks prior and was progressively worsening with 10 lb weight  loss in 1 month. EGD and endoscopic ultrasound done on 10/8/18 showed  4.8x4.4cm hypoechoic round pancreas tail mass (5 LN noted 4-12 mm in size) consistent with pancreatic adenocarcinoma T3 N2 Mx.     The patient states that he is aware of his diagnosis and he expressed his desire to not pursue conventional chemotherapy if his prognosis is not good because he does \"not want to prolong life\" if he doesn't have good quality of life. 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 Patient has been receiving pain control with IV dilaudid PCA and states that his pain is currently well controlled at 2-3/10 with intermittent 5/10, which is much better than before. His Dilaudid drip was stopped about 1.5 hours before my visit. He has already filed paperwork with a  regarding his advanced directive and DPOA. He states that he will consider all his options and will be seeing oncology as soon as he is discharged from the hospital.     He also C/O hiccoughs which began this morning as well as nausea and abdominal distension.    Review of Systems:     Review of Systems   Constitutional: Positive for weight loss. Negative for chills and fever.   HENT: Negative for congestion, ear pain, hearing loss, sinus pain, sore throat and tinnitus.    Eyes: Negative for blurred vision, double vision, " photophobia, pain and redness.   Respiratory: Negative for cough, hemoptysis, sputum production, shortness of breath, wheezing and stridor.    Cardiovascular: Negative for chest pain, palpitations, orthopnea, claudication and leg swelling.   Gastrointestinal: Positive for abdominal pain, constipation and nausea. Negative for blood in stool, diarrhea, heartburn, melena and vomiting.   Genitourinary: Negative for dysuria, flank pain, frequency, hematuria and urgency.   Musculoskeletal: Negative for back pain, falls, joint pain, myalgias and neck pain.   Skin: Negative for itching and rash.   Neurological: Negative for dizziness, tingling, tremors, sensory change, seizures, loss of consciousness and headaches.       Physical Exam:  Physical Exam   Constitutional: He is oriented to person, place, and time and well-developed, well-nourished, and in no distress. No distress.   HENT:   Head: Normocephalic and atraumatic.   Mouth/Throat: No oropharyngeal exudate.   Eyes: Pupils are equal, round, and reactive to light. EOM are normal.   Neck: Normal range of motion. Neck supple. No thyromegaly present.   Cardiovascular: Normal rate and regular rhythm.  Exam reveals no gallop and no friction rub.    No murmur heard.  Pulmonary/Chest: Effort normal and breath sounds normal. No respiratory distress. He has no wheezes. He exhibits no tenderness.   Abdominal: Bowel sounds are normal. He exhibits distension. There is tenderness.   Significant distention secondary to constipation and gastroparesis   Musculoskeletal: He exhibits no edema or deformity.   Neurological: He is alert and oriented to person, place, and time.   Skin: Skin is warm and dry. He is not diaphoretic. No erythema.   Psychiatric: Mood, memory, affect and judgment normal.       Past Medical History:   History reviewed. No pertinent past medical history.    Past Surgical History:  Past Surgical History:   Procedure Laterality Date   • TONSILLECTOMY AND ADENOIDECTOMY  "     age 6       Current Outpatient Medications:  Home Medications     Reviewed by Randy Hernandez (Pharmacy Tech) on 10/06/18 at 1130  Med List Status: Complete   Medication Last Dose Status   docusate sodium (COLACE) 100 MG Cap 10/5/2018 Active   HYDROcodone-acetaminophen (NORCO) 5-325 MG Tab per tablet 10/6/2018 Active                Medication Allergy/Sensitivities:  No Known Allergies      Family History:     Family History   Problem Relation Age of Onset   • Cancer Mother         Liver        Social History:  Social History     Social History   • Marital status: Single     Spouse name: N/A   • Number of children: N/A   • Years of education: N/A     Occupational History   • Not on file.     Social History Main Topics   • Smoking status: Former Smoker   • Smokeless tobacco: Never Used      Comment: Young age - no long and not much   • Alcohol use No      Comment: social on occasiona   • Drug use: Yes     Types: Marijuana      Comment: Tried edibles a few days ago   • Sexual activity: Not on file     Other Topics Concern   • Not on file     Social History Narrative   • No narrative on file     Spiritual History: not taken at this visit     Advanced Care Planning and Goals of Care: POLST completed today at bedside and patients AD/DPOA was presented to me by his daughter Yolanda with his ex wife as primary DPOA and daughter Juana as secondary DPOA. Advanced directive, DPOA, and POLST scanned to [employer]. Patient is DNAR/DNI. The patient states that he is aware of his diagnosis and he expressed his desire to not pursue conventional chemotherapy if his prognosis is not good because he does \"not want to prolong life\" if he doesn't have good quality of life. 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 He has already filed paperwork with a  regarding his advanced directive and DPOA. He states that " "he will consider all his options and will be seeing oncology as soon as he is discharged from the hospital.           Vitals:  Weight/BMI: Body mass index is 21.19 kg/m².  /71   Pulse 92   Temp 36.7 °C (98 °F)   Resp 18   Ht 1.778 m (5' 10\")   Wt 67 kg (147 lb 11.3 oz)   SpO2 94%   BMI 21.19 kg/m²   Vitals:    10/09/18 0000 10/09/18 0400 10/09/18 0443 10/09/18 0800   BP: 138/82 125/77  129/71   Pulse: 94 78  92   Resp: 18 18  18   Temp: 36.3 °C (97.4 °F) 36.7 °C (98 °F)  36.7 °C (98 °F)   SpO2: 96% 94%  94%   Weight:   67 kg (147 lb 11.3 oz)    Height:         Oxygen Therapy:  Pulse Oximetry: 94 %, O2 (LPM): 2, O2 Delivery: Nasal Cannula      Lab Data Review:  Recent Results (from the past 24 hour(s))   HISTOLOGY REQUEST    Collection Time: 10/08/18  3:53 PM   Result Value Ref Range    Pathology Request Sent to [facility]    COMP METABOLIC PANEL    Collection Time: 10/09/18  4:58 AM   Result Value Ref Range    Sodium 132 (L) 135 - 145 mmol/L    Potassium 4.1 3.6 - 5.5 mmol/L    Chloride 96 96 - 112 mmol/L    Co2 22 20 - 33 mmol/L    Anion Gap 14.0 (H) 0.0 - 11.9    Glucose 113 (H) 65 - 99 mg/dL    Bun 9 8 - 22 mg/dL    Creatinine 0.96 0.50 - 1.40 mg/dL    Calcium 8.8 8.4 - 10.2 mg/dL    AST(SGOT) 17 12 - 45 U/L    ALT(SGPT) 9 2 - 50 U/L    Alkaline Phosphatase 45 30 - 99 U/L    Total Bilirubin 1.1 0.1 - 1.5 mg/dL    Albumin 3.1 (L) 3.2 - 4.9 g/dL    Total Protein 6.0 6.0 - 8.2 g/dL    Globulin 2.9 1.9 - 3.5 g/dL    A-G Ratio 1.1 g/dL   MAGNESIUM    Collection Time: 10/09/18  4:58 AM   Result Value Ref Range    Magnesium 2.0 1.5 - 2.5 mg/dL   CBC WITH DIFFERENTIAL    Collection Time: 10/09/18  4:58 AM   Result Value Ref Range    WBC 17.6 (H) 4.8 - 10.8 K/uL    RBC 4.38 (L) 4.70 - 6.10 M/uL    Hemoglobin 14.0 14.0 - 18.0 g/dL    Hematocrit 41.4 (L) 42.0 - 52.0 %    MCV 94.5 81.4 - 97.8 fL    MCH 32.0 27.0 - 33.0 pg    MCHC 33.8 33.7 - 35.3 g/dL    RDW 44.0 35.9 - 50.0 fL    Platelet Count 426 164 - 446 K/uL    " MPV 9.2 9.0 - 12.9 fL    Neutrophils-Polys 86.80 (H) 44.00 - 72.00 %    Lymphocytes 5.40 (L) 22.00 - 41.00 %    Monocytes 7.20 0.00 - 13.40 %    Eosinophils 0.00 0.00 - 6.90 %    Basophils 0.10 0.00 - 1.80 %    Immature Granulocytes 0.50 0.00 - 0.90 %    Nucleated RBC 0.00 /100 WBC    Neutrophils (Absolute) 15.26 (H) 1.82 - 7.42 K/uL    Lymphs (Absolute) 0.95 (L) 1.00 - 4.80 K/uL    Monos (Absolute) 1.27 (H) 0.00 - 0.85 K/uL    Eos (Absolute) 0.00 0.00 - 0.51 K/uL    Baso (Absolute) 0.02 0.00 - 0.12 K/uL    Immature Granulocytes (abs) 0.08 0.00 - 0.11 K/uL    NRBC (Absolute) 0.00 K/uL   ESTIMATED GFR    Collection Time: 10/09/18  4:58 AM   Result Value Ref Range    GFR If African American >60 >60 mL/min/1.73 m 2    GFR If Non African American >60 >60 mL/min/1.73 m 2       Imaging/Procedures Review:    [id number] VIEW   Final Result      Gaseous distention of colon is most likely due to ileus. There are no findings to suggest obstruction      CT-ABDOMEN-PELVIS WITH   Final Result      3.5 x 4.2 x 4.3 cm mass in the tail of pancreas highly suspicious for adenocarcinoma the pancreas.      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      Slight interval increase in small amount of perihepatic, perisplenic and pelvic ascites.      Diverticulosis without evidence of diverticulitis.           Problem List:  1. Cancer related pain   2. Gastroparesis with nausea  3. Hiccups  4. Locally advanced pancreatic cancer  5. Weight loss    Discussion:  Per GI, the patient most likely has pancreatic adenocarcinoma with pathology pending. He has been in significant pain secondary to cancer and gastroparesis however his pain seems to be well controlled with dilaudid PCA (was getting continuous at 0.5 mg/h which was stopped at 0930 this morning). Therefore the patient should benefit from equivalent oral morphine to be prescribed for discharge. Will continue dilaudid PCA at bolus  of 0.5 mg q 15 minute prn for the next 24 hours to see how much pain medication the patient is needing. For example, if the patient ends up using 5mg of dilaudid in the next 24 hours, it will convert to (5mg x 20) 100mg of oral morphine given over 24 hours, which could be 45mg Q12H, keeping in mind 10% reduction for opioid medication rotation/cross tolerance.. Patient has also been experiencing significant nausea and hiccups due to gastroparesis and will benefit from IV reglan. POLST was filled out with the patient during the visit and the patient remains DNAR/DNI.      Recommendations:  - continue IV reglan 5mg q 6 h for ABD distention 2/2 gastroparesis, nausea, and hiccups. May increase to 10mg if needed  - switch to equivalent oral morphine once the 24 hour dilaudid dosage is calculated  -  Ok for daughter to do accupuncture  - patient to F/U with [facility]    Billing detailed with additional 35 minutes for ACP and completion of POLST and confirmation of AD/DPOA. Case discussed with Dr Hale.

## 2018-10-09 NOTE — PROGRESS NOTES
[facility] Hospitalist Progress Note    Date of Service: 10/9/2018    Chief Complaint  68 y.o. male admitted 10/6/2018 with abdominal pain found to have a pancreatic mass.     Interval Problem Update    Pt seen and examined, feeling better today, still constipated, no nausea, had EUS done yesterday, path report pending, seen by palliative team today   Pain better controlled today, will titrate down his PCA           Consultants/Specialty  GI  Palliative team     Disposition  Needs hospitalization for IV pain meds and escalation of oral pain meds.     Ct abd:  3.5 x 4.2 x 4.3 cm mass in the tail of pancreas highly suspicious for adenocarcinoma the pancreas.  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  Slight interval increase in small amount of perihepatic, perisplenic and pelvic ascites.  Diverticulosis without evidence of diverticulitis.        Review of Systems   Constitutional: Negative for chills and fever.   HENT: Negative for congestion and sore throat.    Eyes: Negative for blurred vision and pain.   Respiratory: Negative for cough and shortness of breath.    Cardiovascular: Negative for chest pain and palpitations.   Gastrointestinal: Positive for abdominal pain (improving ) and nausea. Negative for vomiting.   Genitourinary: Negative for dysuria and urgency.   Musculoskeletal: Negative for back pain and neck pain.   Skin: Negative for itching and rash.   Neurological: Negative for dizziness and tingling.   Psychiatric/Behavioral: Negative for depression. The patient does not have insomnia.    All other systems reviewed and are negative.     Physical Exam  Laboratory/Imaging   Hemodynamics  Temp (24hrs), Av.5 °C (97.7 °F), Min:36.3 °C (97.4 °F), Max:37.3 °C (99.1 °F)   Temperature: 36.7 °C (98 °F)  Pulse  Av.1  Min: 78  Max: 107 Heart Rate (Monitored): (!) 105  Blood Pressure : 129/71      Respiratory      Respiration: 18, Pulse Oximetry: 94 %         RUL Breath Sounds: Clear, RML Breath Sounds: Clear, RLL Breath Sounds: Clear, GRIFFIN Breath Sounds: Clear, LLL Breath Sounds: Clear    Fluids    Intake/Output Summary (Last 24 hours) at 10/09/18 1219  Last data filed at 10/09/18 0724   Gross per 24 hour   Intake          2099.75 ml   Output                0 ml   Net          2099.75 ml       Nutrition  Orders Placed This Encounter   Procedures   • Diet Order Full Liquid     Standing Status:   Standing     Number of Occurrences:   1     Order Specific Question:   Diet:     Answer:   Full Liquid [11]     Physical Exam   Constitutional: He is oriented to person, place, and time. He appears well-developed and well-nourished. No distress.   Patient seen and examined  Plan discussed with RN   HENT:   Right Ear: External ear normal.   Left Ear: External ear normal.   Nose: Nose normal.   Eyes: Conjunctivae are normal. No scleral icterus.   Neck: No JVD present. No tracheal deviation present.   Cardiovascular: Normal rate, normal heart sounds and intact distal pulses.    No murmur heard.  Cap refill 2sec  Pulses 2+ throughout     Pulmonary/Chest: Effort normal and breath sounds normal. No respiratory distress. He has no rales.   Abdominal: Soft. Bowel sounds are normal. He exhibits distension. There is tenderness. There is no rebound.   Musculoskeletal: He exhibits no edema or tenderness.   Neurological: He is alert and oriented to person, place, and time.   Skin: Skin is warm and dry. He is not diaphoretic. No erythema.   Normal skin color   Psychiatric: He has a normal mood and affect. His behavior is normal.   Nursing note and vitals reviewed.      Recent Labs      10/07/18   0517  10/08/18   0505  10/09/18   0458   WBC  14.6*  16.8*  17.6*   RBC  4.61*  4.38*  4.38*   HEMOGLOBIN  14.5  14.0  14.0   HEMATOCRIT  42.2  41.2*  41.4*   MCV  91.5  94.1  94.5   MCH  31.5  32.0  32.0   MCHC  34.4  34.0  33.8   RDW  43.2  44.2  44.0   PLATELETCT  308  185  426   MPV  8.7*   10.8  9.2     Recent Labs      10/07/18   0517  10/08/18   0505  10/09/18   0458   SODIUM  130*  130*  132*   POTASSIUM  3.7  4.8  4.1   CHLORIDE  96  102  96   CO2  23  19*  22   GLUCOSE  114*  105*  113*   BUN  7*  7*  9   CREATININE  0.92  0.85  0.96   CALCIUM  8.8  8.4  8.8     Recent Labs      10/08/18   0517   INR  1.21*                  Assessment/Plan     Pancreatic mass- (present on admission)   Assessment & Plan    New diagnosis  GI consulted for ercp and bipopsy  Ca 19-9 pending  Imaging concerning for adenocarcinoma  Causing severe pain- titration of pain meds ongoing.         DNR (do not resuscitate)   Assessment & Plan    Confirmed here        Hyponatremia   Assessment & Plan    Continue iv fluids        Hypokalemia   Assessment & Plan    Trend and replace          Leukocytosis   Assessment & Plan    Suspect leukemoid reaction, no signs of fevers  We will continue to monitor if clinical worsening will initiate antibiotics        AP (abdominal pain)   Assessment & Plan    2/2 pancreatic mass.   Slowly improving, titration of  his PCA down   Continue prn oxycodone.   Reglan added for nausea/vomoiting  Prn haldol also for nausea added              Quality-Core Measures   Reviewed items::  EKG reviewed, Labs reviewed, Medications reviewed and Radiology images reviewed  [place] catheter::  No [place]  DVT prophylaxis pharmacological::  Enoxaparin (Lovenox)  Ulcer Prophylaxis::  Not indicated  Assessed for rehabilitation services:  Patient was assess for and/or received rehabilitation services during this hospitalization

## 2018-10-09 NOTE — CARE PLAN
Problem: Pain Management  Goal: Pain level will decrease to patient's comfort goal    Intervention: Follow pain managment plan developed in collaboration with patient and Interdisciplinary Team  Patient is on a Dilaudid Pca and reports pain less than a 5 at this time.

## 2018-10-09 NOTE — PROGRESS NOTES
"Pt refusing enema and night meds at this time. Pt states \"I feel great\" and request medications for the morning after breakfast. This Rn reviewed all mediations and their benefits with pt.  "

## 2018-10-09 NOTE — PROGRESS NOTES
Report received from Susan at 0700. Pt resting comfortably in bed. States he feels much better this morning and is requesting to advance his diet. Notified that physician will be rounding later this morning. Pt instructed to notify RN with any and all needs and agrees. Will continue with care.

## 2018-10-09 NOTE — PROGRESS NOTES
Gastroenterology Progress Note     Author: Ignacio Mohamud   Date & Time Created: 10/9/2018 11:11 AM    Chief Complaint:  Abdominal pain; pancreatic mass    This is a very pleasant 68 y.o. male who is otherwise healthy in his entire adult life.      He had renal colic before. 2.5 months ago, he developed abd pain, thought it was renal stone, took Potassium citrate, did not work well. He went to Urgent Care, and an Abd US showed a pancreatic tail tumor. He came to [facility] ER for worsening abd pain in the past 2 weeks, unable to sleep. He had to stop working 3 weeks ago. He denied EtOH and smoking. His mother might have had liver cancer. He denied H/o jaundice. He denied EGD or Colonoscopy history. He has lost about 10 lbs in the past 1 month. He has lost his appetite. Patient describes the pain as 7 out of 10 in severity and sometimes shoots up to 10 out of 10 in severity, stabbing, dull pain.  No exacerbating or relieving factors.  On today's admission repeat CT does show increase in size of this mass.       On average, the patient has 1 BM a day, mostly type 3 or 4 on the [place] stool chart. The patient does take NSAIDs (Advil 1 tab prn for abd pain), usually with food in the stomach. Otherwise the patient is doing fine without complaints of fever/chills/dysphagia/odynophagia/heartburn/nausea/vomiting/bloating/constipation/diarrhea/melena/hematochezia.    Interval History:  10/7/2018 Doing fine. Pain tolerable. 3 daughters from [place] are all here. No questions on EUS FNA tomorrow.   10/8/2018: doing well. Pain controlled at 5/10. No significant stool or BM since admission. EUS With FNA done with Dr. Soto showed .8x4.4cm hypoechoic round pancreas tail mass with well-demarcated margins, FNA'd.  Mass invades the splenic vein and artery.  Preliminary diagnosis is positive for adenocarcinoma  10/9/2018: overall feeling well. Reports most of the gas improved after eructation. Still not passing stool or gas yet. Still  using PCA.     Review of Systems:  Review of Systems   Constitutional: Negative.    HENT: Negative.    Eyes: Negative.    Respiratory: Negative.    Cardiovascular: Negative.    Gastrointestinal: Positive for abdominal pain. Negative for blood in stool, constipation, diarrhea, heartburn, melena, nausea and vomiting.   Genitourinary: Negative.    Musculoskeletal: Negative.    Skin: Negative.    Neurological: Negative.    Endo/Heme/Allergies: Negative.    Psychiatric/Behavioral: Negative.           Physical Exam:  Physical Exam   Constitutional: He is oriented to person, place, and time. He appears well-developed and well-nourished.   HENT:   Head: Normocephalic and atraumatic.   Eyes: Pupils are equal, round, and reactive to light. Conjunctivae and EOM are normal.   Neck: Normal range of motion. Neck supple. No JVD present. No tracheal deviation present. No thyromegaly present.   Cardiovascular: Normal rate and regular rhythm.  Exam reveals no gallop and no friction rub.    No murmur heard.  Pulmonary/Chest: Effort normal and breath sounds normal. No stridor. No respiratory distress. He has no wheezes. He has no rales. He exhibits no tenderness.   Abdominal: Bowel sounds are normal. He exhibits no distension and no mass. There is tenderness. There is no rebound and no guarding.   Firm abdomen   Lymphadenopathy:     He has no cervical adenopathy.   Neurological: He is alert and oriented to person, place, and time. He displays normal reflexes. No cranial nerve deficit. He exhibits normal muscle tone. Coordination normal.   Skin: Skin is warm and dry.       Labs:        Invalid input(s): [id number]      Recent Labs      10/06/18   1120  10/07/18   0517  10/08/18   0505  10/09/18   0458   SODIUM  128*  130*  130*  132*   POTASSIUM  3.5*  3.7  4.8  4.1   CHLORIDE  89*  96  102  96   CO2  22  23  19*  22   BUN  12  7*  7*  9   CREATININE  0.99  0.92  0.85  0.96   MAGNESIUM  2.1   --   1.9  2.0   CALCIUM  9.7  8.8  8.4   8.8     Recent Labs      10/06/18   1120  10/07/18   0517  10/08/18   0505  10/09/18   0458   ALTSGPT  10  7  5  9   ASTSGOT  22  11*  17  17   ALKPHOSPHAT  52  43  40  45   TBILIRUBIN  1.4  1.1  1.7*  1.1   LIPASE  21   --    --    --    GLUCOSE  122*  114*  105*  113*     Recent Labs      10/07/18   0517  10/08/18   0505  10/08/18   0517  10/09/18   0458   RBC  4.61*  4.38*   --   4.38*   HEMOGLOBIN  14.5  14.0   --   14.0   HEMATOCRIT  42.2  41.2*   --   41.4*   PLATELETCT  308  185   --   426   PROTHROMBTM   --    --   15.2*   --    INR   --    --   1.21*   --      Recent Labs      10/07/18   0517  10/08/18   0505  10/09/18   0458   WBC  14.6*  16.8*  17.6*   NEUTSPOLYS  80.30*  83.20*  86.80*   LYMPHOCYTES  7.60*  6.40*  5.40*   MONOCYTES  10.40  9.20  7.20   EOSINOPHILS  0.80  0.40  0.00   BASOPHILS  0.30  0.30  0.10   ASTSGOT  11*  17  17   ALTSGPT  7  5  9   ALKPHOSPHAT  43  40  45   TBILIRUBIN  1.1  1.7*  1.1     Hemodynamics:  Temp (24hrs), Av.5 °C (97.7 °F), Min:36.3 °C (97.4 °F), Max:37.3 °C (99.1 °F)  Temperature: 36.7 °C (98 °F)  Pulse  Av.1  Min: 78  Max: 107Heart Rate (Monitored): (!) 105  Blood Pressure : 129/71     Respiratory:    Respiration: 18, Pulse Oximetry: 94 %        RUL Breath Sounds: Clear, RML Breath Sounds: Clear, RLL Breath Sounds: Clear, GRIFFIN Breath Sounds: Clear, LLL Breath Sounds: Clear  Fluids:    Intake/Output Summary (Last 24 hours) at 10/08/18 0912  Last data filed at 10/08/18 0800   Gross per 24 hour   Intake             2250 ml   Output                0 ml   Net             2250 ml     Weight: 67 kg (147 lb 11.3 oz)  GI/Nutrition:  Orders Placed This Encounter   Procedures   • Diet Order Full Liquid     Standing Status:   Standing     Number of Occurrences:   1     Order Specific Question:   Diet:     Answer:   Full Liquid [11]     Medical Decision Making, by Problem:  Active Hospital Problems    Diagnosis   • Pancreatic mass [K86.9]   • DNR (do not resuscitate) [Z66]    • AP (abdominal pain) [R10.9]   • Hypokalemia [E87.6]   • Hyponatremia [E87.1]     Imaging/Procedures Review:    9/19/2018 Abd US: Mass in pancreatic tail measures 5.5 x 3.1 x 4.0 cm. The lesion is not well visualized due to overlying bowel gas. Vascularity of the lesion cannot be evaluated. Differential diagnosis does include pancreatic carcinoma.     10/6/2018 CT:  3.5 x 4.2 x 4.3 cm mass in the tail of pancreas highly suspicious for adenocarcinoma the pancreas.  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  Slight interval increase in small amount of perihepatic, perisplenic and pelvic ascites.  Diverticulosis without evidence of diverticulitis.     Assessment  - Pancreatic tail mass, 5.5 cm; CA19.9: 51.9 (H)  - Abd pain, likely from vascular congestion and abd distention  - Bloating  - Constipation  - Weight loss  - Poor appetite  - Hyponatremia  - Hypokalemia  - Leukocytosis     Plan  - Pain control  - Simethicone for bloating  - Miralax for constipation  -  Await pathology; discussion conducted with patient and daughters, at this point not interested in chemo/radiation or surgery. Discussed keeping an open mind; based on patient, patient agreeable to still see oncology and surgery.  - Advance diet as tolerated; Abdominal series tomorrow, frequent mobilization. Consideration for Methylnaltrexone for possible narcotic induce constipation.  - Enema today  - Wean PCA    Quality-Core Measures

## 2018-10-09 NOTE — CARE PLAN
Problem: Safety  Goal: Will remain free from injury  Outcome: PROGRESSING AS EXPECTED  Pt instructed use of call light OOB or for any other needs; call light in working order and within reach.  Nonskid footwear in place. Pt rounded on frequently.    Problem: Bowel/Gastric:  Goal: Normal bowel function is maintained or improved  Outcome: PROGRESSING SLOWER THAN EXPECTED   10/09/18 0830   OTHER   Last BM 10/05/18  (per pt)   Reglan started per orders. Pt wishes to try enema before taking miralax. Orders received.

## 2018-10-09 NOTE — CARE PLAN
Problem: Bowel/Gastric:  Goal: Will not experience complications related to bowel motility    Intervention: Implement interventions to promote bowel evacuation if inadequate bowel movements in past 48 hours  Pt and Rn will s/w md regarding advancing diet today.

## 2018-10-10 NOTE — PROGRESS NOTES
Rash noted to pt's back.  Pt.states he had the rashy area prior to coming to the hospital.  Therapist who has been working with patient daily said  It has been there since .at least last Friday.  Pt. States it does itch sometimes.

## 2018-10-10 NOTE — DISCHARGE PLANNING
Anticipated Discharge Disposition: Home with Hospice    Action: SW received completed Hospice Choice form from Lázaro Martinez RN.  [facility] was chosen.  SW faxed completed choice form to EMIGDIO Avelar for referral follow up.     Barriers to Discharge: Hospice acceptance.    Plan: await acceptance and assist with discharge as needed.

## 2018-10-10 NOTE — CARE PLAN
Problem: Communication  Goal: The ability to communicate needs accurately and effectively will improve  Outcome: NOT MET  POC discussed, pt states understanding

## 2018-10-10 NOTE — PROGRESS NOTES
Assumed care of patient. Patient restign in bed with eyes closed. Dilaudid PCA rate verified. 2L of O2 placed on patient for O2 sat of 86%, O2 sat now 94%. Will continue to monitor. Bed in low position, call light in reach, side rail x2.

## 2018-10-10 NOTE — PROGRESS NOTES
[facility]ist Progress Note    Date of Service: 10/10/2018    Chief Complaint  68 y.o. male admitted 10/6/2018 with abdominal pain found to have a pancreatic mass.     Interval Problem Update    Pt seen and examined,  had EUS done on 10/8/18 and result came back for adenocarcinoma of the pancreas, also Ct abdomen showing carcinomatosis, after discussion with pt and family they have decided to go with hospice.  Plan is for home with hospice once hospice has been arranged     Consultants/Specialty  GI  Palliative team     Disposition  Plan is for home with hospice     Ct abd:  3.5 x 4.2 x 4.3 cm mass in the tail of pancreas highly suspicious for adenocarcinoma the pancreas.  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  Slight interval increase in small amount of perihepatic, perisplenic and pelvic ascites.  Diverticulosis without evidence of diverticulitis.        Review of Systems   Constitutional: Negative for chills and fever.   HENT: Negative for congestion and sore throat.    Eyes: Negative for blurred vision and pain.   Respiratory: Negative for cough and shortness of breath.    Cardiovascular: Negative for chest pain and palpitations.   Gastrointestinal: Positive for abdominal pain (improving ) and nausea. Negative for vomiting.   Genitourinary: Negative for dysuria and urgency.   Musculoskeletal: Negative for back pain and neck pain.   Skin: Negative for itching and rash.   Neurological: Negative for dizziness and tingling.   Psychiatric/Behavioral: Negative for depression. The patient does not have insomnia.    All other systems reviewed and are negative.     Physical Exam  Laboratory/Imaging   Hemodynamics  Temp (24hrs), Av.8 °C (98.2 °F), Min:36.4 °C (97.6 °F), Max:37 °C (98.6 °F)   Temperature: 36.7 °C (98 °F)  Pulse  Av.9  Min: 78  Max: 107   Blood Pressure : 143/76      Respiratory      Respiration: 18, Pulse Oximetry: 91 %              Fluids    Intake/Output Summary (Last 24 hours) at 10/10/18 1340  Last data filed at 10/10/18 0400   Gross per 24 hour   Intake           1459.6 ml   Output                0 ml   Net           1459.6 ml       Nutrition  Orders Placed This Encounter   Procedures   • Diet Order Full Liquid     Standing Status:   Standing     Number of Occurrences:   1     Order Specific Question:   Diet:     Answer:   Full Liquid [11]     Physical Exam   Constitutional: He is oriented to person, place, and time. He appears well-developed and well-nourished. No distress.   Patient seen and examined  Plan discussed with RN   HENT:   Right Ear: External ear normal.   Left Ear: External ear normal.   Nose: Nose normal.   Eyes: Conjunctivae are normal. No scleral icterus.   Neck: No JVD present. No tracheal deviation present.   Cardiovascular: Normal rate, normal heart sounds and intact distal pulses.    No murmur heard.  Cap refill 2sec  Pulses 2+ throughout     Pulmonary/Chest: Effort normal and breath sounds normal. No respiratory distress. He has no rales.   Abdominal: Soft. Bowel sounds are normal. He exhibits distension. There is tenderness. There is no rebound.   Musculoskeletal: He exhibits no edema or tenderness.   Neurological: He is alert and oriented to person, place, and time.   Skin: Skin is warm and dry. He is not diaphoretic. No erythema.   Normal skin color   Psychiatric: He has a normal mood and affect. His behavior is normal.   Nursing note and vitals reviewed.      Recent Labs      10/08/18   0505  10/09/18   0458  10/10/18   0735   WBC  16.8*  17.6*  16.0*   RBC  4.38*  4.38*  4.12*   HEMOGLOBIN  14.0  14.0  13.1*   HEMATOCRIT  41.2*  41.4*  38.8*   MCV  94.1  94.5  94.2   MCH  32.0  32.0  31.8   MCHC  34.0  33.8  33.8   RDW  44.2  44.0  44.6   PLATELETCT  185  426  328   MPV  10.8  9.2  8.6*     Recent Labs      10/08/18   0505  10/09/18   0458   SODIUM  130*  132*   POTASSIUM  4.8  4.1   CHLORIDE  102  96   CO2   19*  22   GLUCOSE  105*  113*   BUN  7*  9   CREATININE  0.85  0.96   CALCIUM  8.4  8.8     Recent Labs      10/08/18   0517   INR  1.21*                  Assessment/Plan     Pancreatic mass- (present on admission)   Assessment & Plan    New diagnosis  GI consulted had EUS with biopsy done on 10/8/18 and results came for adenocarcinoma of pancreas  Discussed with pt and family and they have decided for hospice. Hospice referral  placed  Pain management         DNR (do not resuscitate)   Assessment & Plan    Confirmed here        Hyponatremia   Assessment & Plan    Continue iv fluids        Hypokalemia   Assessment & Plan    Trend and replace          Leukocytosis   Assessment & Plan    Suspect leukemoid reaction, no signs of fevers  We will continue to monitor if clinical worsening will initiate antibiotics        AP (abdominal pain)   Assessment & Plan    2/2 pancreatic cancer    Slowly improving, titration of  his PCA down   Continue prn oxycodone.   Reglan added for nausea/vomoiting  Prn haldol also for nausea added              Quality-Core Measures   Reviewed items::  EKG reviewed, Labs reviewed, Medications reviewed and Radiology images reviewed  [place] catheter::  No [place]  DVT prophylaxis pharmacological::  Enoxaparin (Lovenox)  Ulcer Prophylaxis::  Not indicated  Assessed for rehabilitation services:  Patient was assess for and/or received rehabilitation services during this hospitalization

## 2018-10-10 NOTE — CARE PLAN
Problem: Discharge Barriers/Planning  Goal: Patient's continuum of care needs will be met    Intervention: Assess potential discharge barriers on admission and throughout hospital stay  Pt. And family have decided to go home with Hospice hopefully tomorrow.  Meeting with Discharge Planning and PT/family today to make plans.  Continues on PCA Dilaudid.

## 2018-10-10 NOTE — PROGRESS NOTES
"Discussed POC with Monique VILLALOBOS. States concern for patients mental well being. POA states that patient said, \"I can't live like this, I just want to die.\" POA and patient request medication change for his bloating and abdominal pain and if unable to relieve pain/blaoting would like a hospice consult. Informed day shift RN of concerns and requests.  "

## 2018-10-10 NOTE — CARE PLAN
Problem: Pain Management  Goal: Pain level will decrease to patient's comfort goal    Intervention: Follow pain managment plan developed in collaboration with patient and Interdisciplinary Team  Pain management plan being followed and is effective.  PCA of Dilaudid.  Continue to assess and reassess pain.

## 2018-10-10 NOTE — PALLIATIVE CARE
"Palliative Care follow-up  Received update form MD that pt and family would like to discuss hospice.   Met with pt, who goes by Ignacio, and his three daughter, Yolanda Warren, elie Reyes. Pt stated that the had received biopsy confirmation of pancreatic cancer and had determined that he did not think he would wish to pursue treatment. Discussed pts conversation with Palliative MD yesterday. Family had questions on what hospice can provide. Spoke about MD BENY oversight without returning to the acute care setting, RN and CNA support. 24/7 coverage for questions or complications. Ignacio states \"All I want is a hospital bed, have my pain controlled, so I can make it for 30 days and see my daughters. I never wanted any traditional chemo and I don't want anything else anymore, no more tests, no quack remedies\". Pts daughters in agreement. Spoke further on benefits of hospice and choices. Pt and family stated that they enjoyed working with Dr Santos very much and felt well cared for. Pt opted for [facility]. Choice form provided to Unit SW and copy provided to pts daughters. POLST completed for DNR/DNI with Comfort Measures only, placed with pts hard chart for DC. Copy of POLST scanned into EMR.     Updated: MD, BS RN, Unit SW.     Plan: Pt to speak with [facility]. Possible DC to home with hospice once accepted and pain is controlled.     Thank you for allowing Palliative Care to participate in this patient's care. Please feel free to call [phone] with any questions or concerns.  "

## 2018-10-10 NOTE — PROGRESS NOTES
"Pt. Had x4 formed bowel movements this am.  Denies Relistor injection and declining rectal tube.  States his plan is to work with Hospice and is desiring to discharge home tomorrow. Pt. States \" I don't want to be poked and prodded anymore.\"  Hospice is supposed to see pt. Today.  MD will be placing Hospice orders. Family involved and plan to be here for Hospice meeting.    "

## 2018-10-10 NOTE — PROGRESS NOTES
Gastroenterology Progress Note     Author: Ignacio Mohamud   Date & Time Created: 10/10/2018 11:28 AM    Chief Complaint:  Abdominal pain; pancreatic mass    This is a very pleasant 68 y.o. male who is otherwise healthy in his entire adult life.      He had renal colic before. 2.5 months ago, he developed abd pain, thought it was renal stone, took Potassium citrate, did not work well. He went to Urgent Care, and an Abd US showed a pancreatic tail tumor. He came to [facility] ER for worsening abd pain in the past 2 weeks, unable to sleep. He had to stop working 3 weeks ago. He denied EtOH and smoking. His mother might have had liver cancer. He denied H/o jaundice. He denied EGD or Colonoscopy history. He has lost about 10 lbs in the past 1 month. He has lost his appetite. Patient describes the pain as 7 out of 10 in severity and sometimes shoots up to 10 out of 10 in severity, stabbing, dull pain.  No exacerbating or relieving factors.  On today's admission repeat CT does show increase in size of this mass.       On average, the patient has 1 BM a day, mostly type 3 or 4 on the [place] stool chart. The patient does take NSAIDs (Advil 1 tab prn for abd pain), usually with food in the stomach. Otherwise the patient is doing fine without complaints of fever/chills/dysphagia/odynophagia/heartburn/nausea/vomiting/bloating/constipation/diarrhea/melena/hematochezia.    Interval History:  10/7/2018 Doing fine. Pain tolerable. 3 daughters from [place] are all here. No questions on EUS FNA tomorrow.   10/8/2018: doing well. Pain controlled at 5/10. No significant stool or BM since admission. EUS With FNA done with Dr. Soto showed .8x4.4cm hypoechoic round pancreas tail mass with well-demarcated margins, FNA'd.  Mass invades the splenic vein and artery.  Preliminary diagnosis is positive for adenocarcinoma  10/9/2018: overall feeling well. Reports most of the gas improved after eructation. Still not passing stool or gas yet. Still  using PCA.   10/10/2018: still having abdominal pain, still having distention, not passing any significant stool    Review of Systems:  Review of Systems   Constitutional: Negative.    HENT: Negative.    Eyes: Negative.    Respiratory: Negative.    Cardiovascular: Negative.    Gastrointestinal: Positive for abdominal pain. Negative for blood in stool, constipation, diarrhea, heartburn, melena, nausea and vomiting.   Genitourinary: Negative.    Musculoskeletal: Negative.    Skin: Negative.    Neurological: Negative.    Endo/Heme/Allergies: Negative.    Psychiatric/Behavioral: Negative.           Physical Exam:  Physical Exam   Constitutional: He is oriented to person, place, and time. He appears well-developed and well-nourished.   HENT:   Head: Normocephalic and atraumatic.   Eyes: Pupils are equal, round, and reactive to light. Conjunctivae and EOM are normal.   Neck: Normal range of motion. Neck supple. No JVD present. No tracheal deviation present. No thyromegaly present.   Cardiovascular: Normal rate and regular rhythm.  Exam reveals no gallop and no friction rub.    No murmur heard.  Pulmonary/Chest: Effort normal and breath sounds normal. No stridor. No respiratory distress. He has no wheezes. He has no rales. He exhibits no tenderness.   Abdominal: Bowel sounds are normal. He exhibits no distension and no mass. There is tenderness. There is no rebound and no guarding.   Firm abdomen   Lymphadenopathy:     He has no cervical adenopathy.   Neurological: He is alert and oriented to person, place, and time. He displays normal reflexes. No cranial nerve deficit. He exhibits normal muscle tone. Coordination normal.   Skin: Skin is warm and dry.       Labs:        Invalid input(s): [id number]      Recent Labs      10/08/18   0505  10/09/18   0458   SODIUM  130*  132*   POTASSIUM  4.8  4.1   CHLORIDE  102  96   CO2  19*  22   BUN  7*  9   CREATININE  0.85  0.96   MAGNESIUM  1.9  2.0   CALCIUM  8.4  8.8     Recent  Labs      10/08/18   0505  10/09/18   0458   ALTSGPT  5  9   ASTSGOT  17  17   ALKPHOSPHAT  40  45   TBILIRUBIN  1.7*  1.1   GLUCOSE  105*  113*     Recent Labs      10/08/18   0505  10/08/18   0517  10/09/18   0458  10/10/18   0735   RBC  4.38*   --   4.38*  4.12*   HEMOGLOBIN  14.0   --   14.0  13.1*   HEMATOCRIT  41.2*   --   41.4*  38.8*   PLATELETCT  185   --   426  328   PROTHROMBTM   --   15.2*   --    --    INR   --   1.21*   --    --      Recent Labs      10/08/18   0505  10/09/18   0458  10/10/18   0735   WBC  16.8*  17.6*  16.0*   NEUTSPOLYS  83.20*  86.80*  79.10*   LYMPHOCYTES  6.40*  5.40*  6.80*   MONOCYTES  9.20  7.20  12.80   EOSINOPHILS  0.40  0.00  0.60   BASOPHILS  0.30  0.10  0.30   ASTSGOT  17  17   --    ALTSGPT  5  9   --    ALKPHOSPHAT  40  45   --    TBILIRUBIN  1.7*  1.1   --      Hemodynamics:  Temp (24hrs), Av.8 °C (98.2 °F), Min:36.4 °C (97.6 °F), Max:37 °C (98.6 °F)  Temperature: 36.7 °C (98 °F)  Pulse  Av.9  Min: 78  Max: 107  Blood Pressure : 143/76     Respiratory:    Respiration: 18, Pulse Oximetry: 91 %           Fluids:    Intake/Output Summary (Last 24 hours) at 10/08/18 0912  Last data filed at 10/08/18 0800   Gross per 24 hour   Intake             2250 ml   Output                0 ml   Net             2250 ml        GI/Nutrition:  Orders Placed This Encounter   Procedures   • Diet Order Full Liquid     Standing Status:   Standing     Number of Occurrences:   1     Order Specific Question:   Diet:     Answer:   Full Liquid [11]     Medical Decision Making, by Problem:  Active Hospital Problems    Diagnosis   • Pancreatic mass [K86.9]   • DNR (do not resuscitate) [Z66]   • AP (abdominal pain) [R10.9]   • Hypokalemia [E87.6]   • Hyponatremia [E87.1]     Imaging/Procedures Review:    2018 Abd US: Mass in pancreatic tail measures 5.5 x 3.1 x 4.0 cm. The lesion is not well visualized due to overlying bowel gas. Vascularity of the lesion cannot be evaluated.  Differential diagnosis does include pancreatic carcinoma.     10/6/2018 CT:  3.5 x 4.2 x 4.3 cm mass in the tail of pancreas highly suspicious for adenocarcinoma the pancreas.  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  Slight interval increase in small amount of perihepatic, perisplenic and pelvic ascites.  Diverticulosis without evidence of diverticulitis.     10/10/2018 CT Scan  1.  Complex cystic mass in the pancreatic tail is similar to the prior exam and highly suspicious for a cystic malignancy.  2.  Occlusion of the splenic vein with venous collateral formation.  3.  Moderate amount of ascites with infiltration of the omentum and peritoneal enhancement, highly suspicious for carcinomatosis  4.  Diverticulosis.      Pathology:  FINAL DIAGNOSIS:    A. Duodenal biopsy:         Benign duodenal mucosa with normal villous architecture and no          histologic evidence to suggest celiac sprue.         Negative for adenomatous change and malignancy.  B. Slides/remnants-pancreas tail mass:         Malignant cells present consistent with adenocarcinoma.  C. Core-tail of pancreas mass:         Infiltrating, moderately to poorly differentiated          adenocarcinoma.    Comment: This case was reviewed with Dr. Garcia and she concurs with  the diagnosis.    Assessment  - Pancreatic tail mass, 5.5 cm; CA19.9: 51.9 (H)- adenocarcinoma of pancreas  - Abd pain, likely from vascular congestion and abd distention  - Bloating  - Constipation  - Weight loss  - Poor appetite  - Hyponatremia  - Hypokalemia  - Leukocytosis     Plan  - Repeat CT Scan this AM performed. Result as above.   - Start methylnaltrexone trial  - Goals of care discussion per family/patient/palliative care. This AM result was not available but later this morning showed adenocarcinoma of the pancreas. Discussed with Hospitalist, patient wants to change goals of care to palliative modality. Concern  for also possible carcinomatosis.  - Will sign off.    GI TO SIGN OFF / STAND BY - PLEASE CALL IF ANY CONCERNS OR QUESTIONS      Quality-Core Measures

## 2018-10-10 NOTE — PROGRESS NOTES
Pt given scheduled dose of MS contin, simethicone and reglan. States he is passing gas but his stomach feels very full. Pt gave himself a water enema this afternoon and states he had a bowel movement. Tolerating sips of fluid. Pt encouraged to mobilize as he tolerates. Pt remains resting comfortably in bed and is encouraged use of call light with any needs. Will continue with care.

## 2018-10-10 NOTE — DISCHARGE PLANNING
Received Choice form at [phone]  Agency/Facility Name: [facility]  Referral sent per Choice form @ [phone]

## 2018-10-11 NOTE — PROGRESS NOTES
Brief acceptance note    68-year-old male admitted on 10/6/18 with pancreatic cancer who presented with abdominal pain and abdominal distention bloating and constipation.  Dr. Mohamud from gastroenterology is following him closely.  Due to the persistent pain.,abdominal, is not controlled with PCA there was concern for pseudoobstruction versus narcotic induced constipation.  Patient is under palliative care.  Patient has opted to choose neostigmine understanding the side effects as well as decompression due to the severity of the pain.  Patient transferred to the ICU for neostigmine and if this does not work patient would undergo decompression colonoscopy at 11 AM.  Patient A, a and oriented * 3  Able to ambulate  This is for palliative purposes due to the severity of his pain    Addendum:    Patient passed gas -- a lot from the Relistrol  Patient feels markedly better  So d/w Dr. Mohamud -- no neostigmine at this time  Medical floor status for now

## 2018-10-11 NOTE — CARE PLAN
Problem: Communication  Goal: The ability to communicate needs accurately and effectively will improve  Patient, RN and family communicating well about patient goals, including pain control and appetite stimulation.     Problem: Pain Management  Goal: Pain level will decrease to patient's comfort goal  RN working with MD to find pain control solution for patient. Patient understanding.

## 2018-10-11 NOTE — PROGRESS NOTES
Gastroenterology Progress Note     Author: Ignacio Mohamud   Date & Time Created: 10/11/2018 3:05 PM    Chief Complaint:  Abdominal pain; pancreatic mass    This is a very pleasant 68 y.o. male who is otherwise healthy in his entire adult life.      He had renal colic before. 2.5 months ago, he developed abd pain, thought it was renal stone, took Potassium citrate, did not work well. He went to Urgent Care, and an Abd US showed a pancreatic tail tumor. He came to [facility] ER for worsening abd pain in the past 2 weeks, unable to sleep. He had to stop working 3 weeks ago. He denied EtOH and smoking. His mother might have had liver cancer. He denied H/o jaundice. He denied EGD or Colonoscopy history. He has lost about 10 lbs in the past 1 month. He has lost his appetite. Patient describes the pain as 7 out of 10 in severity and sometimes shoots up to 10 out of 10 in severity, stabbing, dull pain.  No exacerbating or relieving factors.  On today's admission repeat CT does show increase in size of this mass.       On average, the patient has 1 BM a day, mostly type 3 or 4 on the [place] stool chart. The patient does take NSAIDs (Advil 1 tab prn for abd pain), usually with food in the stomach. Otherwise the patient is doing fine without complaints of fever/chills/dysphagia/odynophagia/heartburn/nausea/vomiting/bloating/constipation/diarrhea/melena/hematochezia.    Interval History:  10/7/2018 Doing fine. Pain tolerable. 3 daughters from [place] are all here. No questions on EUS FNA tomorrow.   10/8/2018: doing well. Pain controlled at 5/10. No significant stool or BM since admission. EUS With FNA done with Dr. Soto showed .8x4.4cm hypoechoic round pancreas tail mass with well-demarcated margins, FNA'd.  Mass invades the splenic vein and artery.  Preliminary diagnosis is positive for adenocarcinoma  10/9/2018: overall feeling well. Reports most of the gas improved after eructation. Still not passing stool or gas yet. Still  using PCA.   10/10/2018: still having abdominal pain, still having distention, not passing any significant stool  10/11/2018: Called back about persistent abdominal pain, not passing significant stool    Review of Systems:  Review of Systems   Constitutional: Negative.    HENT: Negative.    Eyes: Negative.    Respiratory: Negative.    Cardiovascular: Negative.    Gastrointestinal: Positive for abdominal pain. Negative for blood in stool, constipation, diarrhea, heartburn, melena, nausea and vomiting.   Genitourinary: Negative.    Musculoskeletal: Negative.    Skin: Negative.    Neurological: Negative.    Endo/Heme/Allergies: Negative.    Psychiatric/Behavioral: Negative.           Physical Exam:  Physical Exam   Constitutional: He is oriented to person, place, and time. He appears well-developed and well-nourished.   HENT:   Head: Normocephalic and atraumatic.   Eyes: Pupils are equal, round, and reactive to light. Conjunctivae and EOM are normal.   Neck: Normal range of motion. Neck supple. No JVD present. No tracheal deviation present. No thyromegaly present.   Cardiovascular: Normal rate and regular rhythm.  Exam reveals no gallop and no friction rub.    No murmur heard.  Pulmonary/Chest: Effort normal and breath sounds normal. No stridor. No respiratory distress. He has no wheezes. He has no rales. He exhibits no tenderness.   Abdominal: He exhibits distension. He exhibits no mass. There is tenderness. There is no rebound and no guarding.   Firm abdomen   Lymphadenopathy:     He has no cervical adenopathy.   Neurological: He is alert and oriented to person, place, and time. He displays normal reflexes. No cranial nerve deficit. He exhibits normal muscle tone. Coordination normal.   Skin: Skin is warm and dry.       Labs:        Invalid input(s): [id number]      Recent Labs      10/09/18   0458   SODIUM  132*   POTASSIUM  4.1   CHLORIDE  96   CO2  22   BUN  9   CREATININE  0.96   MAGNESIUM  2.0   CALCIUM  8.8      Recent Labs      10/09/18   0458   ALTSGPT  9   ASTSGOT  17   ALKPHOSPHAT  45   TBILIRUBIN  1.1   GLUCOSE  113*     Recent Labs      10/09/18   0458  10/10/18   0735   RBC  4.38*  4.12*   HEMOGLOBIN  14.0  13.1*   HEMATOCRIT  41.4*  38.8*   PLATELETCT  426  328     Recent Labs      10/09/18   0458  10/10/18   0735   WBC  17.6*  16.0*   NEUTSPOLYS  86.80*  79.10*   LYMPHOCYTES  5.40*  6.80*   MONOCYTES  7.20  12.80   EOSINOPHILS  0.00  0.60   BASOPHILS  0.10  0.30   ASTSGOT  17   --    ALTSGPT  9   --    ALKPHOSPHAT  45   --    TBILIRUBIN  1.1   --      Hemodynamics:  Temp (24hrs), Av °C (98.6 °F), Min:36.5 °C (97.7 °F), Max:37.4 °C (99.4 °F)  Temperature: 36.5 °C (97.7 °F)  Pulse  Av.1  Min: 78  Max: 107  Blood Pressure : (!) 165/79     Respiratory:    Respiration: 18, Pulse Oximetry: 94 %        RUL Breath Sounds:  (refused assessment), RML Breath Sounds: Diminished, RLL Breath Sounds: Diminished, GRIFFIN Breath Sounds:  (refused assessment), LLL Breath Sounds: Diminished  Fluids:    Intake/Output Summary (Last 24 hours) at 10/08/18 0912  Last data filed at 10/08/18 0800   Gross per 24 hour   Intake             2250 ml   Output                0 ml   Net             2250 ml        GI/Nutrition:  Orders Placed This Encounter   Procedures   • Diet Order Full Liquid     Standing Status:   Standing     Number of Occurrences:   1     Order Specific Question:   Diet:     Answer:   Full Liquid [11]     Medical Decision Making, by Problem:  Active Hospital Problems    Diagnosis   • Pancreatic mass [K86.9]   • DNR (do not resuscitate) [Z66]   • AP (abdominal pain) [R10.9]   • Hypokalemia [E87.6]   • Hyponatremia [E87.1]     Imaging/Procedures Review:    2018 Abd US: Mass in pancreatic tail measures 5.5 x 3.1 x 4.0 cm. The lesion is not well visualized due to overlying bowel gas. Vascularity of the lesion cannot be evaluated. Differential diagnosis does include pancreatic carcinoma.     10/6/2018 CT:  3.5 x 4.2  x 4.3 cm mass in the tail of pancreas highly suspicious for adenocarcinoma the pancreas.  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  Slight interval increase in small amount of perihepatic, perisplenic and pelvic ascites.  Diverticulosis without evidence of diverticulitis.     10/10/2018 CT Scan  1.  Complex cystic mass in the pancreatic tail is similar to the prior exam and highly suspicious for a cystic malignancy.  2.  Occlusion of the splenic vein with venous collateral formation.  3.  Moderate amount of ascites with infiltration of the omentum and peritoneal enhancement, highly suspicious for carcinomatosis  4.  Diverticulosis.      Pathology:  FINAL DIAGNOSIS:    A. Duodenal biopsy:         Benign duodenal mucosa with normal villous architecture and no          histologic evidence to suggest celiac sprue.         Negative for adenomatous change and malignancy.  B. Slides/remnants-pancreas tail mass:         Malignant cells present consistent with adenocarcinoma.  C. Core-tail of pancreas mass:         Infiltrating, moderately to poorly differentiated          adenocarcinoma.    Comment: This case was reviewed with Dr. Garcia and she concurs with  the diagnosis.    Assessment  - Pancreatic tail mass, 5.5 cm; CA19.9: 51.9 (H)- adenocarcinoma of pancreas  - Abd pain, likely from vascular congestion and abd distention  - Bloating  - Constipation  - Weight loss  - Poor appetite  - Hyponatremia  - Hypokalemia  - Leukocytosis     Plan  - Start methylnaltrexone trial  - Patient choosing palliative care modality of management   - In light of patient persistent abdominal pain which is not controlled with PCA still, still using high dose, highly suspicious for psuedo-obstruction vs narcotic induced constipation. Recommend methylnaltrexone. Discussed ASGE guideline. Given palliative care modality, discussed options of neostigmine (potential side effect, absolute  contraindication, relative contraindication, potential side effect/adverse effect including bradycardia). Discussed about decompressive tube placement how it works and how it gets pushed out once return of bowel function. Discussed comfort as a goal. After extensive discussion, patient prefers to trial neostigmine; will hold a spot for colonoscopy. Medications ordered, patient will transfer to ICU for monitor and telemetry. I have discussed with patient that I can not explain the leukocytosis, unclear if there is a microperforation vs ischemia/etc.  - KUB 2 hours after neostigmine and in AM  - NPO past midnight for possible procedure (unprepped colonoscopy) tomorrow. 11AM     GI TO SIGN OFF / STAND BY - PLEASE CALL IF ANY CONCERNS OR QUESTIONS      Quality-Core Measures

## 2018-10-11 NOTE — PROGRESS NOTES
"Patient resting in bed.  Dilaudid PCA settings reviewed.  No needs at this time.  Patient refusing nursing assessment, states \"he has nothing wrong with his heart and lung fields, never had a problem\" and that he \"has pancreatic cancer and is going to hospice tomorrow\".  Call bell in reach.    "

## 2018-10-11 NOTE — PROGRESS NOTES
BP 170s, patient declining intervention.  States he has pain and that's the reason why it's elevated, would prefer to have it rechecked in the morning to see if it decreases.  Continuing with antiemetics for nausea, denies vomiting.  Call bell in reach.

## 2018-10-11 NOTE — PROGRESS NOTES
[facility]ist Progress Note    Date of Service: 10/11/2018    Chief Complaint  68 y.o. male admitted 10/6/2018 with abdominal pain found to have a pancreatic mass.     Interval Problem Update  No overnight events,  had EUS done on 10/8/18 and result came back for adenocarcinoma of the pancreas, also Ct abdomen showing carcinomatosis, after discussion with pt and family they have decided to go with hospice.  Still with abdominal pain, CT showed colonic distention, using rectal tube  For decompression, also increasing his long actin MS contin     Consultants/Specialty  GI  Palliative team     Disposition  Plan is for home with hospice     Ct abd:  3.5 x 4.2 x 4.3 cm mass in the tail of pancreas highly suspicious for adenocarcinoma the pancreas.  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  Slight interval increase in small amount of perihepatic, perisplenic and pelvic ascites.  Diverticulosis without evidence of diverticulitis.        Review of Systems   Constitutional: Negative for chills and fever.   HENT: Negative for congestion and sore throat.    Eyes: Negative for blurred vision and pain.   Respiratory: Negative for cough and shortness of breath.    Cardiovascular: Negative for chest pain and palpitations.   Gastrointestinal: Positive for abdominal pain (improving ) and nausea. Negative for vomiting.   Genitourinary: Negative for dysuria and urgency.   Musculoskeletal: Negative for back pain and neck pain.   Skin: Negative for itching and rash.   Neurological: Negative for dizziness and tingling.   Psychiatric/Behavioral: Negative for depression. The patient does not have insomnia.    All other systems reviewed and are negative.     Physical Exam  Laboratory/Imaging   Hemodynamics  Temp (24hrs), Av.9 °C (98.5 °F), Min:36.5 °C (97.7 °F), Max:37.4 °C (99.4 °F)   Temperature: 36.5 °C (97.7 °F)  Pulse  Av.1  Min: 78  Max: 107   Blood Pressure :  (!) 165/79      Respiratory      Respiration: 18, Pulse Oximetry: 94 %        RUL Breath Sounds:  (refused assessment), RML Breath Sounds: Diminished, RLL Breath Sounds: Diminished, GRIFFIN Breath Sounds:  (refused assessment), LLL Breath Sounds: Diminished    Fluids    Intake/Output Summary (Last 24 hours) at 10/11/18 1156  Last data filed at 10/11/18 0100   Gross per 24 hour   Intake              240 ml   Output                0 ml   Net              240 ml       Nutrition  Orders Placed This Encounter   Procedures   • Diet Order Full Liquid     Standing Status:   Standing     Number of Occurrences:   1     Order Specific Question:   Diet:     Answer:   Full Liquid [11]     Physical Exam   Constitutional: He is oriented to person, place, and time. He appears well-developed and well-nourished. No distress.   Patient seen and examined  Plan discussed with RN   MANUELT:   Right Ear: External ear normal.   Left Ear: External ear normal.   Nose: Nose normal.   Eyes: Conjunctivae are normal. No scleral icterus.   Neck: No JVD present. No tracheal deviation present.   Cardiovascular: Normal rate, normal heart sounds and intact distal pulses.    No murmur heard.  Cap refill 2sec  Pulses 2+ throughout     Pulmonary/Chest: Effort normal and breath sounds normal. No respiratory distress. He has no rales.   Abdominal: Soft. Bowel sounds are normal. He exhibits distension. There is tenderness. There is no rebound.   Musculoskeletal: He exhibits no edema or tenderness.   Neurological: He is alert and oriented to person, place, and time.   Skin: Skin is warm and dry. He is not diaphoretic. No erythema.   Normal skin color   Psychiatric: He has a normal mood and affect. His behavior is normal.   Nursing note and vitals reviewed.      Recent Labs      10/09/18   0458  10/10/18   0735   WBC  17.6*  16.0*   RBC  4.38*  4.12*   HEMOGLOBIN  14.0  13.1*   HEMATOCRIT  41.4*  38.8*   MCV  94.5  94.2   MCH  32.0  31.8   MCHC  33.8  33.8   RDW   44.0  44.6   PLATELETCT  426  328   MPV  9.2  8.6*     Recent Labs      10/09/18   0458   SODIUM  132*   POTASSIUM  4.1   CHLORIDE  96   CO2  22   GLUCOSE  113*   BUN  9   CREATININE  0.96   CALCIUM  8.8                      Assessment/Plan     Pancreatic mass- (present on admission)   Assessment & Plan    New diagnosis  GI consulted had EUS with biopsy done on 10/8/18 and results came for adenocarcinoma of pancreas  Discussed with pt and family and they have decided for hospice. Hospice referral  placed  Pain management         DNR (do not resuscitate)   Assessment & Plan    Confirmed here        Hyponatremia   Assessment & Plan    Improved         Hypokalemia   Assessment & Plan    Resolved           Leukocytosis   Assessment & Plan    Suspect leukemoid reaction, no signs of fevers  We will continue to monitor         AP (abdominal pain)   Assessment & Plan    2/2 pancreatic cancer    Slowly improving, titration of  his PCA down   Continue prn oxycodone.   Reglan added for nausea/vomoiting  Prn haldol also for nausea added              Quality-Core Measures   Reviewed items::  EKG reviewed, Labs reviewed, Medications reviewed and Radiology images reviewed  Wakefield catheter::  No Wakefield  DVT prophylaxis pharmacological::  Enoxaparin (Lovenox)  Ulcer Prophylaxis::  Not indicated  Assessed for rehabilitation services:  Patient was assess for and/or received rehabilitation services during this hospitalization

## 2018-10-11 NOTE — PROGRESS NOTES
[facility]ist Progress Note    Date of Service: 10/11/2018    Chief Complaint  68 y.o. male admitted 10/6/2018 with abdominal pain found to have a pancreatic mass.     Interval Problem Update  No overnight events,  had EUS done on 10/8/18 and result came back for adenocarcinoma of the pancreas, also Ct abdomen showing carcinomatosis, after discussion with pt and family they have decided to go with hospice.  Still with abdominal pain, CT showed colonic distention, using rectal tube  For decompression, also increasing his long actin MS contin     Consultants/Specialty  GI  Palliative team     Disposition  Plan is for home with hospice     Ct abd:  3.5 x 4.2 x 4.3 cm mass in the tail of pancreas highly suspicious for adenocarcinoma the pancreas.  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  Slight interval increase in small amount of perihepatic, perisplenic and pelvic ascites.  Diverticulosis without evidence of diverticulitis.        Review of Systems   Constitutional: Negative for chills and fever.   HENT: Negative for congestion and sore throat.    Eyes: Negative for blurred vision and pain.   Respiratory: Negative for cough and shortness of breath.    Cardiovascular: Negative for chest pain and palpitations.   Gastrointestinal: Positive for abdominal pain (improving ) and nausea. Negative for vomiting.   Genitourinary: Negative for dysuria and urgency.   Musculoskeletal: Negative for back pain and neck pain.   Skin: Negative for itching and rash.   Neurological: Negative for dizziness and tingling.   Psychiatric/Behavioral: Negative for depression. The patient does not have insomnia.    All other systems reviewed and are negative.     Physical Exam  Laboratory/Imaging   Hemodynamics  Temp (24hrs), Av °C (98.6 °F), Min:36.5 °C (97.7 °F), Max:37.4 °C (99.4 °F)   Temperature: 36.5 °C (97.7 °F)  Pulse  Av.1  Min: 78  Max: 107   Blood Pressure : (!)  165/79      Respiratory      Respiration: 18, Pulse Oximetry: 94 %        RUL Breath Sounds:  (refused assessment), RML Breath Sounds: Diminished, RLL Breath Sounds: Diminished, GRIFFIN Breath Sounds:  (refused assessment), LLL Breath Sounds: Diminished    Fluids    Intake/Output Summary (Last 24 hours) at 10/11/18 1604  Last data filed at 10/11/18 0100   Gross per 24 hour   Intake              240 ml   Output                0 ml   Net              240 ml       Nutrition  Orders Placed This Encounter   Procedures   • Diet Order Full Liquid     Standing Status:   Standing     Number of Occurrences:   1     Order Specific Question:   Diet:     Answer:   Full Liquid [11]   • Diet NPO     Standing Status:   Standing     Number of Occurrences:   8     Order Specific Question:   Restrict to:     Answer:   Strict [1]   • Diet NPO     Standing Status:   Standing     Number of Occurrences:   8     Order Specific Question:   Restrict to:     Answer:   Sips with Medications [3]     Physical Exam   Constitutional: He is oriented to person, place, and time. He appears well-developed and well-nourished. No distress.   Patient seen and examined  Plan discussed with RN   HENT:   Right Ear: External ear normal.   Left Ear: External ear normal.   Nose: Nose normal.   Eyes: Conjunctivae are normal. No scleral icterus.   Neck: No JVD present. No tracheal deviation present.   Cardiovascular: Normal rate, normal heart sounds and intact distal pulses.    No murmur heard.  Cap refill 2sec  Pulses 2+ throughout     Pulmonary/Chest: Effort normal and breath sounds normal. No respiratory distress. He has no rales.   Abdominal: Soft. Bowel sounds are normal. He exhibits distension. There is tenderness. There is no rebound.   Musculoskeletal: He exhibits no edema or tenderness.   Neurological: He is alert and oriented to person, place, and time.   Skin: Skin is warm and dry. He is not diaphoretic. No erythema.   Normal skin color   Psychiatric: He  has a normal mood and affect. His behavior is normal.   Nursing note and vitals reviewed.      Recent Labs      10/09/18   0458  10/10/18   0735   WBC  17.6*  16.0*   RBC  4.38*  4.12*   HEMOGLOBIN  14.0  13.1*   HEMATOCRIT  41.4*  38.8*   MCV  94.5  94.2   MCH  32.0  31.8   MCHC  33.8  33.8   RDW  44.0  44.6   PLATELETCT  426  328   MPV  9.2  8.6*     Recent Labs      10/09/18   0458   SODIUM  132*   POTASSIUM  4.1   CHLORIDE  96   CO2  22   GLUCOSE  113*   BUN  9   CREATININE  0.96   CALCIUM  8.8                      Assessment/Plan     Pancreatic mass- (present on admission)   Assessment & Plan    New diagnosis  GI consulted had EUS with biopsy done on 10/8/18 and results came for adenocarcinoma of pancreas  Discussed with pt and family and they have decided for hospice. Hospice referral  placed  Pain management         DNR (do not resuscitate)   Assessment & Plan    Confirmed here        Hyponatremia   Assessment & Plan    Improved         Hypokalemia   Assessment & Plan    Resolved           Leukocytosis   Assessment & Plan    Suspect leukemoid reaction, no signs of fevers  We will continue to monitor         AP (abdominal pain)   Assessment & Plan    2/2 pancreatic cancer    Slowly improving, titration of  his PCA down   Continue prn oxycodone.   Reglan added for nausea/vomoiting  Prn haldol also for nausea added              Quality-Core Measures   Reviewed items::  EKG reviewed, Labs reviewed, Medications reviewed and Radiology images reviewed  Wakefield catheter::  No Wakefield  DVT prophylaxis pharmacological::  Enoxaparin (Lovenox)  Ulcer Prophylaxis::  Not indicated  Assessed for rehabilitation services:  Patient was assess for and/or received rehabilitation services during this hospitalization      Addendum: had Discussion with Dr. Santos regarding pt goals of care, and since pt has colonic dilation case was discussed with GI regarding best option we have to help him with this colonic dilation before he goes  into hospice. After discussion with GI and also with patient and family, a decision was made to try neostigmine to see if that will help if decompression, and if not, pt is scheduled for colonoscopy and decompression colonic tube placement tomorrow.  Pt will be transferred to ICU due use the Neostigmine, with atropine at bedside.  Case was also discussed with Dr. Jose who is the ICU attending.

## 2018-10-11 NOTE — PROGRESS NOTES
Report received. Assumed care of patient. Patient up ambulating with family. RN discussed pain control goals with patient. Per patient, pain is not being controlled and is fairly consistently at 7/10 and at very best 5/10 despite utilizing PCA appropriately. Patient also states that he has very poor appetite. RN will speak with MD regarding this. All other needs are currently met. All safety precautions in place. Will continue to monitor.

## 2018-10-11 NOTE — DISCHARGE PLANNING
Anticipated Discharge Disposition: Home with hospice    Action: SW received phone message from [facility] RN who stated that they have already met with this patient and signed consent's.  Pain continues to me a concern and so they are awaiting for medical clearance for discharge.    Barriers to Discharge: none     Plan: SW available to assist as needed.

## 2018-10-11 NOTE — CARE PLAN
Problem: Safety  Goal: Will remain free from falls  Outcome: PROGRESSING AS EXPECTED  Fall education provided, encouraged to use call bell prior to ambulating.  Patient verbalized understanding.    Problem: Respiratory:  Goal: Respiratory status will improve  Outcome: PROGRESSING AS EXPECTED  Cough/deep breathing exercises performed.

## 2018-10-11 NOTE — DISCHARGE PLANNING
Anticipated Discharge Disposition: Home with Hospice    Action: SW called [facility] to request and update on referral for this patient.  SW awaiting a call back from Hospice RN.     Barriers to Discharge: Hospice arrangements    Plan: SW available at assist as needed.

## 2018-10-12 NOTE — CARE PLAN
Problem: Knowledge Deficit  Goal: Knowledge of disease process/condition, treatment plan, diagnostic tests, and medications will improve  Long discussion with RN regarding plan of care and wishes to discharge home on hospice.     Problem: Pain Management  Goal: Pain level will decrease to patient's comfort goal  Patient is utilizing dilaudid PCA as needed for pain.

## 2018-10-12 NOTE — PROGRESS NOTES
Pt arrival to ICU at 1600. Dr. Jose at bedside to speak with patient and family. Pt passing gas and feeling relief from some abdominal distension. IV DC'd and replaced. POC discussed. Pt instructed to notify Rn with any and all needs and agrees. Will continue with care.

## 2018-10-12 NOTE — HOSPICE
Pt wanting to go home today with hospice. DME ordered for delivery today (will not delay discharge.) Home meds ordered through [facility]. Daughter will  meds. Hospice admission nurse will meet pt and family at the home to complete the hospice admission.

## 2018-10-12 NOTE — PROGRESS NOTES
Report received. Assumed care of patient. Patient up and ambulating in room. Patient currently deferring vital signs and full assessment. Patient had long conversation with RN about wanting to defer colonoscopy today and discharge home on hospice. Patient states that he wants to be in the comfort of his home and feels that this treatment course is futile. RN providing emotional support. Patient would like to talk with his daughters this morning and then make a final decision on disposition today. RN will speak with MD. All needs are currently met. All safety precautions in place. Will continue to monitor and be available as needed.

## 2018-10-12 NOTE — PROGRESS NOTES
"Pt is a/ox4. Continues to have pain with Dilaudid PCA, pt educated on frequency and encouraged to use it as needed. Pt and three daughters appear to be coping well. Pt voices wishes to go home today after colonoscopy. Feels that not much progress has been made, and voiced doubts that any resolution to his abd distention will be found by remaining hospitalized and going through any further procedures. Pt stated he does not feel like he will live longer than a month, states that he feels himself \"weakening\", pt discussed his spiritual beliefs and outlook on life with his nurse, stated that he is content with his prognosis.  "

## 2018-10-12 NOTE — CONSULTS
Please review formal consult from Dr. Santos 10/9.      Thank you for allowing Palliative Care to participate in this patient's care. Please feel free to call [phone] with any questions or concerns.

## 2018-10-12 NOTE — CARE PLAN
Problem: Venous Thromboembolism (VTW)/Deep Vein Thrombosis (DVT) Prevention:  Goal: Patient will participate in Venous Thrombosis (VTE)/Deep Vein Thrombosis (DVT)Prevention Measures  Outcome: PROGRESSING AS EXPECTED  Pt refusing SCD's, getting OOB to use commode to urinate. Independent within room.     Problem: Bowel/Gastric:  Goal: Normal bowel function is maintained or improved  Outcome: PROGRESSING SLOWER THAN EXPECTED  Pt passed gas on previous shift today, no flatus this shift. BS active in all four quadrants. Abd semi firm, distended. Pt denies N/V.

## 2018-10-12 NOTE — DISCHARGE INSTRUCTIONS
Discharge Instructions    Discharged to home by car with relative. Discharged via wheelchair, hospital escort: Yes.  Special equipment needed: Not Applicable    Be sure to schedule a follow-up appointment with your primary care doctor or any specialists as instructed.     Discharge Plan:   Diet Plan: Discussed  Activity Level: Discussed  Confirmed Follow up Appointment: Patient to Call and Schedule Appointment  Confirmed Symptoms Management: Discussed  Medication Reconciliation Updated: Yes  Pneumococcal Vaccine Administered/Refused: Not given - Patient refused pneumococcal vaccine  Influenza Vaccine Indication: Patient Refuses    I understand that a diet low in cholesterol, fat, and sodium is recommended for good health. Unless I have been given specific instructions below for another diet, I accept this instruction as my diet prescription.   Other diet: Eat anything you want!    Special Instructions: None    · Is patient discharged on Warfarin / Coumadin?   No     Depression / Suicide Risk    As you are discharged from this [facility] facility, it is important to learn how to keep safe from harming yourself.    Recognize the warning signs:  · Abrupt changes in personality, positive or negative- including increase in energy   · Giving away possessions  · Change in eating patterns- significant weight changes-  positive or negative  · Change in sleeping patterns- unable to sleep or sleeping all the time   · Unwillingness or inability to communicate  · Depression  · Unusual sadness, discouragement and loneliness  · Talk of wanting to die  · Neglect of personal appearance   · Rebelliousness- reckless behavior  · Withdrawal from people/activities they love  · Confusion- inability to concentrate     If you or a loved one observes any of these behaviors or has concerns about self-harm, here's what you can do:  · Talk about it- your feelings and reasons for harming yourself  · Remove any means that you might use to hurt  yourself (examples: pills, rope, extension cords, firearm)  · Get professional help from the community (Mental Health, Substance Abuse, psychological counseling)  · Do not be alone:Call your Safe Contact- someone whom you trust who will be there for you.  · Call your local CRISIS HOTLINE 212-0940 or 240-726-2883  · Call your local Children's Mobile Crisis Response Team [place] (338) 196-4037 or www.GetYou  · Call the toll free National Suicide Prevention Hotlines   · National Suicide Prevention Lifeline 480-643-COSQ (7177)  · National Hope Line Network 800-SUICIDE (295-2011)

## 2018-10-12 NOTE — CARE PLAN
Problem: Pain Management  Goal: Pain level will decrease to patient's comfort goal   10/11/18 1630 10/11/18 1746   OTHER   Pain Scale 0 - 10  --  4   Non Verbal Scale  Calm;Unlabored Breathing --    Pt states relief of abdominal pain/distension after administration of relistor and use of dilaudid PCA.

## 2018-10-13 NOTE — DISCHARGE SUMMARY
Discharge Summary    CHIEF COMPLAINT ON ADMISSION  Chief Complaint   Patient presents with   • Abdominal Pain       Reason for Admission  stomach pain and mass     Admission Date  10/6/2018    CODE STATUS  Prior    HPI & HOSPITAL COURSE  This is a 68 y.o. male with No PMHx,  presented 10/6/2018 to the ER for evaluation of abdominal pain which started about 2 weeks ago and has been progressively worsening.  Patient describes the pain as 7 out of 10 in severity and sometimes shoots up to 10 out of 10 in severity, stabbing, dull pain. Patient was recently seen in urgent care and underwent a CT scan of his abdomen pelvis which revealed a pancreatic tail mass.  In the ER on  admission repeat CT does show increase in size of this mass. Gi was consulted. Pt was started on pain management. He had an EUS done and results from pathology came back positive for pancreatic adenocarcinoma.  Pt doesn't want to pursue any treatment so palliative was consulted and pt agreed for hospice. He was also having some colonic distention due to use of pain medication, was given relistor and able to pass the gas.  Pt will be discharged home today with hospice    Therefore, he is discharged in guarded and stable condition to hospice.    The patient met 2-midnight criteria for an inpatient stay at the time of discharge.    Discharge Date  10/12/2018      DISCHARGE DIAGNOSES  Active Problems:    Pancreatic mass POA: Yes    AP (abdominal pain) POA: Unknown    Hypokalemia POA: Unknown    Hyponatremia POA: Unknown    DNR (do not resuscitate) POA: Unknown  Resolved Problems:    * No resolved hospital problems. *      FOLLOW UP  Future Appointments  Date Time Provider Department Center   11/2/2018 8:20 AM Lucinda Reyna M.D. [facility] Reyna M.D.  [address]  [place] [address]  [phone]    Call  As needed    Carmencita Santos M.D.  [address]  [place] [address] [phone]            MEDICATIONS  ON DISCHARGE     Medication List      CONTINUE taking these medications      Instructions   ATROPINE SULFATE (OPHTH) OP   Place 2 Drops under tongue every four hours as needed (excessive secretions).  Dose:  2 Drop     bisacodyl 10 MG Supp  Commonly known as:  DULCOLAX   Insert 10 mg in rectum as needed (constipation).  Dose:  10 mg     docusate sodium 100 MG Caps  Commonly known as:  COLACE   Take 1 Cap by mouth 2 times a day.  Dose:  100 mg     LORazepam 2 MG/ML Conc  Commonly known as:  ATIVAN   Take 0.5 mg by mouth every four hours as needed (anxiety, agitation, restlessness, sleep).  Dose:  0.5 mg     metoclopramide 10 MG Tabs  Commonly known as:  REGLAN   Take 10 mg by mouth 4 times a day.  Dose:  10 mg     * morphine ER 30 MG Tbcr tablet  Commonly known as:  MS CONTIN   Take 30 mg by mouth every 12 hours.  Dose:  30 mg     * morphine 20 MG/ML Soln  Commonly known as:  ROXANOL   Take 5 mg by mouth every 2 hours as needed (pain or dyspnea).  Dose:  5 mg     senna-docusate 8.6-50 MG Tabs  Commonly known as:  PERICOLACE or SENOKOT S   Take 1 Tab by mouth 2 times a day as needed for Constipation.  Dose:  1 Tab     Simethicone 80 MG Tabs   Doctor's comments:  may refuse  Take 1 Tab by mouth 2 Times a Day.  Dose:  1 Tab     ZOFRAN ODT 4 MG Tbdp  Generic drug:  ondansetron   Take 4 mg by mouth every four hours as needed for Nausea.  Dose:  4 mg        * This list has 2 medication(s) that are the same as other medications prescribed for you. Read the directions carefully, and ask your doctor or other care provider to review them with you.            ASK your doctor about these medications      Instructions   HYDROcodone-acetaminophen 5-325 MG Tabs per tablet  Commonly known as:  NORCO  Ask about: Should I take this medication?   Take 1 Tab by mouth every 12 hours as needed for up to 15 days.  Dose:  1 Tab            Allergies  Allergies   Allergen Reactions   • Apple        DIET  No orders of the defined types were  placed in this encounter.      ACTIVITY  As tolerated.    CONSULTATIONS  GI: Dr. Mohamud     PROCEDURES  EUS     LABORATORY  Lab Results   Component Value Date    SODIUM 132 (L) 10/09/2018    POTASSIUM 4.1 10/09/2018    CHLORIDE 96 10/09/2018    CO2 22 10/09/2018    GLUCOSE 113 (H) 10/09/2018    BUN 9 10/09/2018    CREATININE 0.96 10/09/2018        Lab Results   Component Value Date    WBC 16.0 (H) 10/10/2018    HEMOGLOBIN 13.1 (L) 10/10/2018    HEMATOCRIT 38.8 (L) 10/10/2018    PLATELETCT 328 10/10/2018        Total time of the discharge process exceeds 40 minutes.

## 2019-09-09 NOTE — PROGRESS NOTES
Subjective:      Uriel Branham is a 68 y.o. male who presents with Abdominal Pain (L back pain radiating towards the from diaphram, also after eating anything with fat in it get severe pain, began with GI distress after an antibiotic, thinking gallstones or kidney stones )            Patient is a pleasant 68-year-old male who presents today with upper abdominal pain with intermittent radiation to the left side of his back and flank off and on for the last 6 months.  Patient reports the last few days the pain has notably intensified localizing more to the left upper quadrant and epigastric region-worse with any consumption of food.  Patient is concerned about possible kidney stone and or gallbladder etiology of discomfort at this time.  Patient denies any fevers, chills but does report notable fatigue.  Patient denies any urinary symptoms, urinary urgency or frequency or dysuria.  Patient denies prior history of abdominal surgeries or any other history of gallstones.  Patient does report an episode 11 years ago which was very similar of which he suspected he was passing a kidney stone at that time. He did not have evaluation during that time.       Abdominal Pain   This is a new problem. The current episode started 1 to 4 weeks ago. The onset quality is gradual. The problem occurs constantly. The problem has been gradually worsening. The pain is located in the epigastric region and LUQ. The pain is at a severity of 7/10. The pain is moderate. Pain radiation: Left flank, and lower abd. Associated symptoms include nausea. Pertinent negatives include no anorexia, constipation, diarrhea, dysuria, fever, flatus, frequency, hematuria, melena or vomiting. Exacerbated by: fatty/greasy foods.  The pain is relieved by nothing. He has tried nothing for the symptoms. There is no history of gallstones or pancreatitis.       Review of Systems   Constitutional: Positive for malaise/fatigue. Negative for fever.   HENT: Negative for  "congestion and sore throat.    Eyes: Negative for discharge and redness.   Gastrointestinal: Positive for abdominal pain and nausea. Negative for anorexia, blood in stool, constipation, diarrhea, flatus, melena and vomiting.   Genitourinary: Positive for flank pain. Negative for dysuria, frequency, hematuria and urgency.   Musculoskeletal: Negative for falls and joint pain.   Skin: Negative for itching and rash.   Neurological: Positive for weakness.   All other systems reviewed and are negative.         Objective:     /68   Pulse 86   Temp 37.1 °C (98.7 °F)   Resp 18   Ht 1.778 m (5' 10\")   Wt 65.1 kg (143 lb 9.6 oz)   SpO2 99%   BMI 20.60 kg/m²    PMH:  has no past medical history on file.  MEDS:   Current Outpatient Prescriptions:   •  MethylPREDNISolone (MEDROL DOSEPAK) 4 MG Tablet Therapy Pack, Take 1 Tab by mouth every day., Disp: 1 Kit, Rfl: 0  •  albuterol 108 (90 Base) MCG/ACT Aero Soln inhalation aerosol, Inhale 2 Puffs by mouth every 6 hours as needed for Shortness of Breath., Disp: 1 Inhaler, Rfl: 0  ALLERGIES: No Known Allergies  SURGHX: No past surgical history on file.  SOCHX:  reports that he has quit smoking. He has never used smokeless tobacco.  FH: Family history was reviewed, no pertinent findings to report    Physical Exam   Constitutional: He is oriented to person, place, and time. He appears well-developed and well-nourished. No distress.   HENT:   Head: Normocephalic and atraumatic.   Right Ear: External ear normal.   Left Ear: External ear normal.   Mouth/Throat: Oropharynx is clear and moist. No oropharyngeal exudate.   Eyes: Pupils are equal, round, and reactive to light. Conjunctivae and EOM are normal.   Neck: Normal range of motion. Neck supple. No tracheal deviation present.   Cardiovascular: Normal rate and regular rhythm.    No murmur heard.  Pulmonary/Chest: Effort normal and breath sounds normal. No respiratory distress.   Abdominal: Soft. There is no " hepatosplenomegaly. There is tenderness in the right lower quadrant.       Musculoskeletal: Normal range of motion. He exhibits no edema.   Neurological: He is alert and oriented to person, place, and time. Coordination normal.   Skin: Skin is warm. No rash noted.   Psychiatric: He has a normal mood and affect. His behavior is normal. Judgment and thought content normal.   Vitals reviewed.              POC Color YELLOW  Negative Final   POC Appearance CLEAR  Negative Final   POC Leukocyte Esterase NEG  Negative Final   POC Nitrites PINK  Negative Final   POC Urobiligen NEG  Negative (0.2) mg/dL Final   POC Protein NEG  Negative mg/dL Final   POC Urine PH 6.5  5.0 - 8.0 Final   POC Blood NEG  Negative Final   POC Specific Gravity 1.010  <1.005 - >1.030 Final   POC Ketones 15  Negative mg/dL Final   POC Bilirubin NEG  Negative mg/dL Final   POC Glucose NEG  Negative mg/dL        Assessment/Plan:     1. Right lower quadrant abdominal pain  - POCT Urinalysis    2. Abdominal pain, unspecified abdominal location  - CBC WITH DIFFERENTIAL; Future  - COMP METABOLIC PANEL; Future  - LIPASE; Future  - URINE CULTURE(NEW); Future    3. Urinary frequency    Patient symptoms and examination are slightly inconsistent today as patient is with minimal epigastric and upper abdominal tenderness and most notably localizes tenderness to the right lower quadrant.  Patient is still with appendix at this time and with recent worsening symptoms the last few days-significantly worse today it warrants timely workup.  Will check labs of CBC, CMP, lipase for further evaluation to rule out pancreatitis, electrolyte abnormalities, will also send off for urine culture at this time.  Finally CT with contrast was initiated today for further evaluation of right lower quadrant and other intra-abdominal structures.  Due to patient's age and lack of history of laboratory findings kidney function must be checked as patient is having contrast.  In the  interim while waiting on labs and further evaluation with CT of patient's symptoms progressively get worse patient must have evaluation through the emergency room as this would potentially be a delay in care.  I will send off for urine culture at this time of which patient is without any notable blood, flank pain or CVA tenderness at this time renal stone unlikely however CT with contrast may shadow this however will show us any stranding or hydronephrosis if noted.  Patient given precautionary s/sx that mandate immediate follow up and evaluation in the ED. Advised of risks of not doing so.    DDX, Supportive care, and indications for immediate follow-up discussed with patient.    Instructed to return to clinic or nearest emergency department if we are not available for any change in condition, further concerns, or worsening of symptoms.    The patient demonstrated a good understanding and agreed with the treatment plan.  Please note that this dictation was created using voice recognition software. I have made every reasonable attempt to correct obvious errors, but I expect that there are errors of grammar and possibly content that I did not discover before finalizing the note.      9/18- 1.  Exam was performed without IV contrast. There appears to be an expansile lesion within the pancreatic tail. Differential diagnosis includes pancreatic pseudocyst abscess or pancreatic carcinoma. Ultrasound may be helpful for further evaluation.   Contrast enhanced CT or MRI may also be helpful for further evaluation.  2.  Possible varices and mesenteric vascular congestion.  3.  Small amount of ascites is noted in the abdomen and pelvis.  4.  No dilated loops of bowel are identified.    Called and spoke with patient regarding the above results- more investigation into pancreatic tail is warranted at this time-patient would not like to pursue IV studies and prefers the US at this time. I have since ordered this stat- I have  contacted [facility] to assist with scheduling such for this patient and contact him- VERY STRICT ER precautions were given today as pt. Remains with abd. Pain at this time- no fevers, but fatigue.   Will alert pt. As results return and make the appt. Referrals if needed.       8/- Called [phone] and left message for this patient regarding US- referral to Oncology coordinator was made for further evaluation of pancreatic mass- as pancreatic carcinoma needs to be further R/O.   Will attempt to reach this patient again.      no
